# Patient Record
Sex: MALE | Employment: OTHER | URBAN - METROPOLITAN AREA
[De-identification: names, ages, dates, MRNs, and addresses within clinical notes are randomized per-mention and may not be internally consistent; named-entity substitution may affect disease eponyms.]

---

## 2021-08-21 ENCOUNTER — TRANSFERRED RECORDS (OUTPATIENT)
Dept: HEALTH INFORMATION MANAGEMENT | Facility: CLINIC | Age: 81
End: 2021-08-21

## 2021-08-21 LAB
CREATININE (EXTERNAL): 1.31 MG/DL (ref 0.73–1.18)
GFR ESTIMATED (EXTERNAL): 52 ML/MIN/1.73M2
GLUCOSE (EXTERNAL): 106 MG/DL (ref 70–100)
HBA1C MFR BLD: 5.8 %
POTASSIUM (EXTERNAL): 4.6 MMOL/L (ref 3.5–5.1)

## 2021-11-16 ENCOUNTER — NURSE TRIAGE (OUTPATIENT)
Dept: NURSING | Facility: CLINIC | Age: 81
End: 2021-11-16

## 2021-11-16 NOTE — TELEPHONE ENCOUNTER
Reason for Disposition    MODERATE pain (e.g., interferes with normal activities or awakens from sleep)    Additional Information    Negative: Passed out (i.e., lost consciousness, collapsed and was not responding)    Negative: Shock suspected (e.g., cold/pale/clammy skin, too weak to stand, low BP, rapid pulse)    Negative: Sounds like a life-threatening emergency to the triager    Negative: SEVERE pain (e.g., excruciating, scale 8-10) and present > 1 hour    Negative: Sudden onset of severe flank pain and age > 60    Negative: Abdominal pain and age > 60    Negative: Unable to urinate (or only a few drops) > 4 hours and bladder feels very full (e.g., palpable bladder or strong urge to urinate)    Negative: Pain radiates into groin, scrotum    Negative: Blood in urine (red, pink, or tea-colored)    Negative: Vomiting    Negative: Weakness of a leg or foot (e.g., unable to bear weight, dragging foot)    Negative: Patient sounds very sick or weak to the triager    Negative: Fever > 100.4 F (38.0 C)    Negative: Pain or burning with passing urine (urination)    Protocols used: FLANK PAIN-A-OH

## 2021-11-16 NOTE — TELEPHONE ENCOUNTER
Spoke to patient. Scheduled with internal med on 11/17/21.  Mary Nascimento, HARRIET on 11/16/2021 at 4:17 PM

## 2021-11-16 NOTE — TELEPHONE ENCOUNTER
"Clinic Action Needed: Yes, new patient asking if he can be seen Wed PM or Thu/Fri. Please call Jose Rafael at [phone].    FNA Triage Call  Presenting Problem:    Jose Rafael reports the following:  - left flank pain x 1 week, radiates to his back. Located in \"belt area.\"  - passing gas helps with pain relief.  - difficult to sleep at times  - constant pain, described as dull  - pain rated 2/10 at the time of call, can be 5/10 sometimes  - feels like he has to pass BM all the time, but not passing any stools when he attempts to toilet. Had BM this AM.  - sometimes has urinary incontinence    Takes Gaviscon for acid reflux.    Recently moved to [place]. Previous PCP with [facility]. Would like to establish care with PCPs Mayerchak, Fischer, Schoen, Halberg. FNA advised he would need an acute visit and he said he's okay seeing any available provider.     Per protocol, advised same day visit. Care advice reviewed. Patient verbalizes understanding. Advised to call back with further questions/concerns.     Per , no openings at [facility] location until 11/30, [facility]'s soonest visit is in December, same as with other nearby clinics.    Routing message to [facility] Care Team to offer pt a sooner opening. Pt states that he is available on Thu or Fri, or tomorrow late in the afternoon.    Belkis Herrera RN/[place] Nurse Advisor                     "

## 2022-02-06 ENCOUNTER — HEALTH MAINTENANCE LETTER (OUTPATIENT)
Age: 82
End: 2022-02-06

## 2022-02-11 ENCOUNTER — OFFICE VISIT (OUTPATIENT)
Dept: FAMILY MEDICINE | Facility: CLINIC | Age: 82
End: 2022-02-11
Payer: COMMERCIAL

## 2022-02-11 VITALS
DIASTOLIC BLOOD PRESSURE: 70 MMHG | HEART RATE: 85 BPM | BODY MASS INDEX: 33.27 KG/M2 | HEIGHT: 66 IN | TEMPERATURE: 97.4 F | OXYGEN SATURATION: 95 % | WEIGHT: 207 LBS | SYSTOLIC BLOOD PRESSURE: 134 MMHG | RESPIRATION RATE: 10 BRPM

## 2022-02-11 DIAGNOSIS — N18.30 TYPE 2 DIABETES MELLITUS WITH STAGE 3 CHRONIC KIDNEY DISEASE, WITHOUT LONG-TERM CURRENT USE OF INSULIN, UNSPECIFIED WHETHER STAGE 3A OR 3B CKD (H): ICD-10-CM

## 2022-02-11 DIAGNOSIS — Z76.89 ENCOUNTER TO ESTABLISH CARE WITH NEW DOCTOR: Primary | ICD-10-CM

## 2022-02-11 DIAGNOSIS — E11.22 TYPE 2 DIABETES MELLITUS WITH STAGE 3 CHRONIC KIDNEY DISEASE, WITHOUT LONG-TERM CURRENT USE OF INSULIN, UNSPECIFIED WHETHER STAGE 3A OR 3B CKD (H): ICD-10-CM

## 2022-02-11 PROBLEM — N18.32 TYPE 2 DIABETES MELLITUS WITH STAGE 3B CHRONIC KIDNEY DISEASE, WITHOUT LONG-TERM CURRENT USE OF INSULIN (H): Status: ACTIVE | Noted: 2022-02-11

## 2022-02-11 PROCEDURE — 99203 OFFICE O/P NEW LOW 30 MIN: CPT | Performed by: FAMILY MEDICINE

## 2022-02-11 RX ORDER — ASPIRIN 81 MG/1
TABLET, CHEWABLE ORAL
COMMUNITY

## 2022-02-11 RX ORDER — AMLODIPINE BESYLATE 10 MG/1
TABLET ORAL
COMMUNITY
End: 2023-02-14

## 2022-02-11 RX ORDER — LISINOPRIL 5 MG/1
TABLET ORAL
COMMUNITY
End: 2022-09-09

## 2022-02-11 RX ORDER — CHOLECALCIFEROL (VITAMIN D3) 50 MCG
TABLET ORAL
COMMUNITY
End: 2023-10-18

## 2022-02-11 RX ORDER — METFORMIN HYDROCHLORIDE 750 MG/1
TABLET, EXTENDED RELEASE ORAL
COMMUNITY
End: 2022-06-24

## 2022-02-11 ASSESSMENT — MIFFLIN-ST. JEOR: SCORE: 1596.7

## 2022-02-11 NOTE — PROGRESS NOTES
"  Assessment & Plan     Encounter to establish care with new doctor  Patient has been under the care of Dr. Jeremy Davenport at [facility] in [place]. Dr. Davenport retired. Patient and wife moved to [facility]. Patient requests transfer care to me.   Patient medications reconciled, PMH and Problem list reviewed and HM updated.    Type 2 diabetes mellitus with stage 3 chronic kidney disease, without long-term current use of insulin, unspecified whether stage 3a or 3b CKD (H)  Chronic controlled with last A1C 5.8. Currently on ASA, ACEI low dose and metformin. Currently not on statin as LDL was 81. The current medical regimen is effective;  continue present plan and medications. Follow up in 5 months for AWV      Review of prior external note(s) from - CareEverywhere information from [facility]  reviewed         BMI:   Estimated body mass index is 33.41 kg/m  as calculated from the following:    Height as of this encounter: 1.676 m (5' 6\").    Weight as of this encounter: 93.9 kg (207 lb).   Weight management plan: Discussed healthy diet and exercise guidelines    Work on weight loss  Regular exercise  Use Tonic Water in evening for leg cramps    Return in about 5 months (around 7/11/2022) for Follow up, Routine preventive, with me, in person.    Dk Ortiz MD  [facility] WARD Mantilla is a 81 year old who presents for the following health issues     History of Present Illness   He consumes 2 sweetened beverage(s) daily.He exercises with enough effort to increase his heart rate 9 or less minutes per day.  He exercises with enough effort to increase his heart rate 3 or less days per week. He is missing 1 dose(s) of medications per week.  He is not taking prescribed medications regularly due to remembering to take.       Chief Complaint   Patient presents with     Establish Care     Patient has been under the care of Dr. Jeremy Davenport at [facility] in [place]. " "Dr. Davenport retired. Patient and wife moved to [facility]. Patient requests transfer care to me.   Patient medications reconciled, PMH and Problem list reviewed and HM updated.    Diabetes Follow-up    How often are you checking your blood sugar? A few times a month  What time of day are you checking your blood sugars (select all that apply)?  In the morning  Have you had any blood sugars above 200?  No  Have you had any blood sugars below 70?  No    What symptoms do you notice when your blood sugar is low?  None    What concerns do you have today about your diabetes? None     Do you have any of these symptoms? (Select all that apply)  Burning in feet and Redness, sores, or blisters on feet                Hypertension Follow-up      Do you check your blood pressure regularly outside of the clinic? No     Are you following a low salt diet? No    Are your blood pressures ever more than 140 on the top number (systolic) OR more   than 90 on the bottom number (diastolic), for example 140/90? No NA       Reports hand and leg cramps every morning. Does play violin, mentions left hand cramping while driving home from playing, a few times.     BP Readings from Last 2 Encounters:   02/11/22 134/70     No results found for: A1C, LDL    Review of Systems   CONSTITUTIONAL: NEGATIVE for fever, chills, change in weight  ENT/MOUTH: NEGATIVE for ear, mouth and throat problems  RESP: NEGATIVE for significant cough or SOB  CV: NEGATIVE for chest pain, palpitations or peripheral edema  GI: POSITIVE for dysphagia, heartburn or reflux, Hx GERD and prior EGD in 2016 and NEGATIVE for melena, vomiting and weight loss  MUSCULOSKELETAL: NEGATIVE for significant arthralgias or myalgia and muscle cramps-nocturnal  NEURO: NEGATIVE for weakness, dizziness or paresthesias  ROS otherwise negative      Objective    /70   Pulse 85   Temp 97.4  F (36.3  C)   Resp 10   Ht 1.676 m (5' 6\")   Wt 93.9 kg (207 lb)   SpO2 95%   BMI 33.41 " kg/m    Body mass index is 33.41 kg/m .  Physical Exam   GENERAL: healthy, alert, no distress and obese  NECK: no adenopathy, no asymmetry, masses, or scars and thyroid normal to palpation  RESP: lungs clear to auscultation - no rales, rhonchi or wheezes  CV: regular rate and rhythm, normal S1 S2, no S3 or S4, no murmur, click or rub, no peripheral edema and peripheral pulses strong  ABDOMEN: soft, nontender, no hepatosplenomegaly, no masses and bowel sounds normal  MS: no gross musculoskeletal defects noted, no edema    No results found for any previous visit.

## 2022-03-03 ENCOUNTER — E-VISIT (OUTPATIENT)
Dept: INTERNAL MEDICINE | Facility: CLINIC | Age: 82
End: 2022-03-03
Payer: COMMERCIAL

## 2022-03-03 DIAGNOSIS — G47.62 LEG CRAMPS, SLEEP RELATED: Primary | ICD-10-CM

## 2022-03-03 DIAGNOSIS — E11.22 TYPE 2 DIABETES MELLITUS WITH STAGE 3B CHRONIC KIDNEY DISEASE, WITHOUT LONG-TERM CURRENT USE OF INSULIN (H): ICD-10-CM

## 2022-03-03 DIAGNOSIS — N18.32 TYPE 2 DIABETES MELLITUS WITH STAGE 3B CHRONIC KIDNEY DISEASE, WITHOUT LONG-TERM CURRENT USE OF INSULIN (H): ICD-10-CM

## 2022-03-03 PROCEDURE — 99421 OL DIG E/M SVC 5-10 MIN: CPT | Performed by: INTERNAL MEDICINE

## 2022-03-04 NOTE — PATIENT INSTRUCTIONS
Thank you for choosing us for your care. Given your symptoms, I would like you to do a lab-only visit to determine what is causing them.  I have placed the orders.  Please schedule an appointment with the lab right here in [place], or call [phone].  I will let you know when the results are back and next steps to take.

## 2022-03-10 ENCOUNTER — LAB (OUTPATIENT)
Dept: LAB | Facility: CLINIC | Age: 82
End: 2022-03-10
Payer: COMMERCIAL

## 2022-03-10 DIAGNOSIS — E11.22 TYPE 2 DIABETES MELLITUS WITH STAGE 3B CHRONIC KIDNEY DISEASE, WITHOUT LONG-TERM CURRENT USE OF INSULIN (H): ICD-10-CM

## 2022-03-10 DIAGNOSIS — N18.32 TYPE 2 DIABETES MELLITUS WITH STAGE 3B CHRONIC KIDNEY DISEASE, WITHOUT LONG-TERM CURRENT USE OF INSULIN (H): ICD-10-CM

## 2022-03-10 DIAGNOSIS — G47.62 LEG CRAMPS, SLEEP RELATED: ICD-10-CM

## 2022-03-10 LAB
ALBUMIN SERPL-MCNC: 4 G/DL (ref 3.4–5)
ALP SERPL-CCNC: 59 U/L (ref 40–150)
ALT SERPL W P-5'-P-CCNC: 29 U/L (ref 0–70)
ANION GAP SERPL CALCULATED.3IONS-SCNC: 5 MMOL/L (ref 3–14)
AST SERPL W P-5'-P-CCNC: 18 U/L (ref 0–45)
BILIRUB SERPL-MCNC: 0.5 MG/DL (ref 0.2–1.3)
BUN SERPL-MCNC: 17 MG/DL (ref 7–30)
CALCIUM SERPL-MCNC: 8.8 MG/DL (ref 8.5–10.1)
CHLORIDE BLD-SCNC: 103 MMOL/L (ref 94–109)
CO2 SERPL-SCNC: 28 MMOL/L (ref 20–32)
CREAT SERPL-MCNC: 1.32 MG/DL (ref 0.66–1.25)
FERRITIN SERPL-MCNC: 184 NG/ML (ref 26–388)
GFR SERPL CREATININE-BSD FRML MDRD: 55 ML/MIN/1.73M2
GLUCOSE BLD-MCNC: 110 MG/DL (ref 70–99)
HBA1C MFR BLD: 6 % (ref 0–5.6)
IRON SATN MFR SERPL: 18 % (ref 15–46)
IRON SERPL-MCNC: 57 UG/DL (ref 35–180)
MAGNESIUM SERPL-MCNC: 2 MG/DL (ref 1.6–2.3)
POTASSIUM BLD-SCNC: 4.1 MMOL/L (ref 3.4–5.3)
PROT SERPL-MCNC: 7.6 G/DL (ref 6.8–8.8)
SODIUM SERPL-SCNC: 136 MMOL/L (ref 133–144)
T4 FREE SERPL-MCNC: 0.92 NG/DL (ref 0.76–1.46)
TIBC SERPL-MCNC: 317 UG/DL (ref 240–430)
TSH SERPL DL<=0.005 MIU/L-ACNC: 4.71 MU/L (ref 0.4–4)

## 2022-03-10 PROCEDURE — 84439 ASSAY OF FREE THYROXINE: CPT

## 2022-03-10 PROCEDURE — 82728 ASSAY OF FERRITIN: CPT

## 2022-03-10 PROCEDURE — 36415 COLL VENOUS BLD VENIPUNCTURE: CPT

## 2022-03-10 PROCEDURE — 80053 COMPREHEN METABOLIC PANEL: CPT

## 2022-03-10 PROCEDURE — 83550 IRON BINDING TEST: CPT

## 2022-03-10 PROCEDURE — 84443 ASSAY THYROID STIM HORMONE: CPT

## 2022-03-10 PROCEDURE — 83735 ASSAY OF MAGNESIUM: CPT

## 2022-03-10 PROCEDURE — 83036 HEMOGLOBIN GLYCOSYLATED A1C: CPT

## 2022-03-31 ENCOUNTER — TELEPHONE (OUTPATIENT)
Dept: FAMILY MEDICINE | Facility: CLINIC | Age: 82
End: 2022-03-31
Payer: COMMERCIAL

## 2022-03-31 DIAGNOSIS — L30.9 DERMATITIS: Primary | ICD-10-CM

## 2022-03-31 NOTE — TELEPHONE ENCOUNTER
Reason for Call:  Medication or medication refill:     Do you use a [facility]?  Name of the pharmacy and phone number for the current request:  [facility]    Name of the medication requested: triancinolone 0.1%     Other request: would like the Jar of it. 450g. Not the small tube.   He uses it for his skin condition that he has had since birth when it becomes inflamed.     Can we leave a detailed message on this number? YES    Phone number patient can be reached at: Home number on file [phone] (home)    Best Time: anytime.     Call taken on 3/31/2022 at 3:43 PM by Kat Brambila

## 2022-04-01 RX ORDER — TRIAMCINOLONE ACETONIDE 1 MG/G
CREAM TOPICAL 3 TIMES DAILY
Qty: 450 G | Refills: 0 | Status: SHIPPED | OUTPATIENT
Start: 2022-04-01 | End: 2023-10-18

## 2022-06-22 DIAGNOSIS — N18.32 TYPE 2 DIABETES MELLITUS WITH STAGE 3B CHRONIC KIDNEY DISEASE, WITHOUT LONG-TERM CURRENT USE OF INSULIN (H): Primary | ICD-10-CM

## 2022-06-22 DIAGNOSIS — E11.22 TYPE 2 DIABETES MELLITUS WITH STAGE 3B CHRONIC KIDNEY DISEASE, WITHOUT LONG-TERM CURRENT USE OF INSULIN (H): Primary | ICD-10-CM

## 2022-06-24 RX ORDER — METFORMIN HYDROCHLORIDE 750 MG/1
TABLET, EXTENDED RELEASE ORAL
Qty: 270 TABLET | Refills: 0 | Status: SHIPPED | OUTPATIENT
Start: 2022-06-24 | End: 2022-08-03

## 2022-06-24 NOTE — TELEPHONE ENCOUNTER
Pending Prescriptions:                       Disp   Refills    metFORMIN (GLUCOPHAGE-XR) 750 MG 24 hr tab*270 ta*         Sig: TAKE 2 TABLETS BY MOUTH EVERY MORNING AND 1 TABLET BY           MOUTH EVERY EVENING.    Routing refill request to provider for review/approval because:  Medication is reported/historical

## 2022-06-30 ENCOUNTER — TELEPHONE (OUTPATIENT)
Dept: FAMILY MEDICINE | Facility: CLINIC | Age: 82
End: 2022-06-30

## 2022-06-30 NOTE — TELEPHONE ENCOUNTER
Central Prior Authorization Team   Phone: [phone]      PA Initiation    Medication: metFORMIN (GLUCOPHAGE-XR) 750 MG 24 hr tablet  Insurance Company: [employer] - Phone [phone] Fax [phone]  Pharmacy Filling the Rx: [id number] [place], [place] - [address] [facility] [address]  Filling Pharmacy Phone: [phone]  Filling Pharmacy Fax:    Start Date: 6/30/2022

## 2022-06-30 NOTE — TELEPHONE ENCOUNTER
Patient stating his insurance is only wanting to cover two tablets daily instead of the three as prescribed. Informed we would place a prior auth on his metformin. Stacey Yip LPN    Prior Authorization Retail Medication Request    Medication/Dose: metFORMIN (GLUCOPHAGE-XR) 750 MG 24 hr tablet  ICD code (if different than what is on RX): Type 2 diabetes mellitus with stage 3b chronic kidney disease, without long-term current use of insulin (H) [E11.22, N18.32]  - Primary     Previously Tried and Failed:    Rationale:      Insurance Name:  [employer]  Insurance ID:  [id number]      Pharmacy Information (if different than what is on RX)  Name:  [facility]  Phone:  [phone]

## 2022-07-01 NOTE — TELEPHONE ENCOUNTER
Prior Authorization Approval    Authorization Effective Date: 5/30/2022  Authorization Expiration Date: 6/30/2023  Medication: metFORMIN (GLUCOPHAGE-XR) 750 MG 24 hr tablet-APPROVED  Approved Dose/Quantity:   Reference #:     Insurance Company: [employer] - Phone [phone] Fax [phone]  Expected CoPay:       CoPay Card Available:      Foundation Assistance Needed:    Which Pharmacy is filling the prescription (Not needed for infusion/clinic administered): [id number] [place], [place] - [address] [facility] [address]  Pharmacy Notified: Yes  Patient Notified: No    **Instructed pharmacy to notify patient when script is ready to /ship.**

## 2022-07-24 ENCOUNTER — HEALTH MAINTENANCE LETTER (OUTPATIENT)
Age: 82
End: 2022-07-24

## 2022-08-03 ENCOUNTER — OFFICE VISIT (OUTPATIENT)
Dept: FAMILY MEDICINE | Facility: CLINIC | Age: 82
End: 2022-08-03
Payer: COMMERCIAL

## 2022-08-03 VITALS
TEMPERATURE: 97.2 F | SYSTOLIC BLOOD PRESSURE: 138 MMHG | RESPIRATION RATE: 20 BRPM | HEART RATE: 84 BPM | BODY MASS INDEX: 33.98 KG/M2 | OXYGEN SATURATION: 94 % | DIASTOLIC BLOOD PRESSURE: 76 MMHG | WEIGHT: 210.5 LBS

## 2022-08-03 DIAGNOSIS — Z11.59 NEED FOR HEPATITIS C SCREENING TEST: ICD-10-CM

## 2022-08-03 DIAGNOSIS — E11.22 TYPE 2 DIABETES MELLITUS WITH STAGE 3B CHRONIC KIDNEY DISEASE, WITHOUT LONG-TERM CURRENT USE OF INSULIN (H): ICD-10-CM

## 2022-08-03 DIAGNOSIS — N18.32 TYPE 2 DIABETES MELLITUS WITH STAGE 3B CHRONIC KIDNEY DISEASE, WITHOUT LONG-TERM CURRENT USE OF INSULIN (H): ICD-10-CM

## 2022-08-03 DIAGNOSIS — Z00.00 ENCOUNTER FOR MEDICARE ANNUAL WELLNESS EXAM: Primary | ICD-10-CM

## 2022-08-03 LAB
CHOLEST SERPL-MCNC: 134 MG/DL
CREAT UR-MCNC: 169 MG/DL
FASTING STATUS PATIENT QL REPORTED: NO
HBA1C MFR BLD: 5.9 % (ref 0–5.6)
HDLC SERPL-MCNC: 27 MG/DL
HGB BLD-MCNC: 15.7 G/DL (ref 13.3–17.7)
LDLC SERPL CALC-MCNC: 46 MG/DL
MICROALBUMIN UR-MCNC: 177 MG/L
MICROALBUMIN/CREAT UR: 104.73 MG/G CR (ref 0–17)
NONHDLC SERPL-MCNC: 107 MG/DL
PHOSPHATE SERPL-MCNC: 2.9 MG/DL (ref 2.5–4.5)
PTH-INTACT SERPL-MCNC: 32 PG/ML (ref 15–65)
TRIGL SERPL-MCNC: 306 MG/DL

## 2022-08-03 PROCEDURE — 82043 UR ALBUMIN QUANTITATIVE: CPT | Performed by: FAMILY MEDICINE

## 2022-08-03 PROCEDURE — 36415 COLL VENOUS BLD VENIPUNCTURE: CPT | Performed by: FAMILY MEDICINE

## 2022-08-03 PROCEDURE — 99213 OFFICE O/P EST LOW 20 MIN: CPT | Mod: 25 | Performed by: FAMILY MEDICINE

## 2022-08-03 PROCEDURE — 86803 HEPATITIS C AB TEST: CPT | Performed by: FAMILY MEDICINE

## 2022-08-03 PROCEDURE — 84100 ASSAY OF PHOSPHORUS: CPT | Performed by: FAMILY MEDICINE

## 2022-08-03 PROCEDURE — 83036 HEMOGLOBIN GLYCOSYLATED A1C: CPT | Performed by: FAMILY MEDICINE

## 2022-08-03 PROCEDURE — G0439 PPPS, SUBSEQ VISIT: HCPCS | Performed by: FAMILY MEDICINE

## 2022-08-03 PROCEDURE — 80061 LIPID PANEL: CPT | Performed by: FAMILY MEDICINE

## 2022-08-03 PROCEDURE — 83970 ASSAY OF PARATHORMONE: CPT | Performed by: FAMILY MEDICINE

## 2022-08-03 PROCEDURE — 85018 HEMOGLOBIN: CPT | Performed by: FAMILY MEDICINE

## 2022-08-03 RX ORDER — METFORMIN HYDROCHLORIDE 750 MG/1
TABLET, EXTENDED RELEASE ORAL
Qty: 270 TABLET | Refills: 3 | Status: SHIPPED | OUTPATIENT
Start: 2022-08-03 | End: 2023-10-18

## 2022-08-03 ASSESSMENT — ENCOUNTER SYMPTOMS
DIARRHEA: 0
SORE THROAT: 0
MYALGIAS: 1
EYE PAIN: 0
PARESTHESIAS: 0
HEARTBURN: 0
FEVER: 0
COUGH: 0
NERVOUS/ANXIOUS: 0
NAUSEA: 0
SHORTNESS OF BREATH: 0
PALPITATIONS: 0
DIZZINESS: 0
HEADACHES: 0
ABDOMINAL PAIN: 0
HEMATURIA: 0
JOINT SWELLING: 0
WEAKNESS: 0
FREQUENCY: 0
ARTHRALGIAS: 0
CONSTIPATION: 0
DYSURIA: 0
CHILLS: 0
HEMATOCHEZIA: 0

## 2022-08-03 ASSESSMENT — PAIN SCALES - GENERAL: PAINLEVEL: NO PAIN (0)

## 2022-08-03 ASSESSMENT — ACTIVITIES OF DAILY LIVING (ADL): CURRENT_FUNCTION: NO ASSISTANCE NEEDED

## 2022-08-03 NOTE — PATIENT INSTRUCTIONS
Patient Education   Personalized Prevention Plan  You are due for the preventive services outlined below.  Your care team is available to assist you in scheduling these services.  If you have already completed any of these items, please share that information with your care team to update in your medical record.  Health Maintenance Due   Topic Date Due     Cholesterol Lab  Never done     Kidney Microalbumin Urine Test  Never done     Parathyroid Lab  Never done     Phosphorous Lab  Never done     Diabetic Foot Exam  Never done     ANNUAL REVIEW OF HM ORDERS  Never done     Discuss Advance Care Planning  Never done     Eye Exam  Never done     Hemoglobin  Never done     Urine Test  Never done     Hepatitis C Screening  Never done     Pneumococcal Vaccine (2 - PCV) 12/08/2011     Diptheria Tetanus Pertussis (DTAP/TDAP/TD) Vaccine (2 - Td or Tdap) 12/03/2017     Zoster (Shingles) Vaccine (2 of 2) 11/21/2019     COVID-19 Vaccine (4 - Booster for Pfizer series) 04/14/2022     A1C Lab  06/10/2022       Exercise for a Healthier Heart  You may wonder how you can improve the health of your heart. If you re thinking about exercise, you re on the right track. You don t need to become an athlete. But you do need a certain amount of brisk exercise to help strengthen your heart. If you have been diagnosed with a heart condition, your healthcare provider may advise exercise to help stabilize your condition. To help make exercise a habit, choose safe, fun activities.      Exercise with a friend. When activity is fun, you're more likely to stick with it.   Before you start  Check with your healthcare provider before starting an exercise program. This is especially important if you have not been active for a while. It's also important if you have a long-term (chronic) health problem such as heart disease, diabetes, or obesity. Or if you are at high risk for having these problems.   Why exercise?  Exercising regularly offers many  healthy rewards. It can help you do all of the following:     Improve your blood cholesterol level to help prevent further heart trouble    Lower your blood pressure to help prevent a stroke or heart attack    Control diabetes, or reduce your risk of getting this disease    Improve your heart and lung function    Reach and stay at a healthy weight    Make your muscles stronger so you can stay active    Prevent falls and fractures by slowing the loss of bone mass (osteoporosis)    Manage stress better    Reduce your blood pressure    Improve your sense of self and your body image  Exercise tips      Ease into your routine. Set small goals. Then build on them. If you are not sure what your activity level should be, talk with your healthcare provider first before starting an exercise routine.    Exercise on most days. Aim for a total of 150 minutes (2 hours and 30 minutes) or more of moderate-intensity aerobic activity each week. Or 75 minutes (1 hour and 15 minutes) or more of vigorous-intensity aerobic activity each week. Or try for a combination of both. Moderate activity means that you breathe heavier and your heart rate increases but you can still talk. Think about doing 40 minutes of moderate exercise, 3 to 4 times a week. For best results, activity should last for about 40 minutes to lower blood pressure and cholesterol. It's OK to work up to the 40-minute period over time. Examples of moderate-intensity activity are walking 1 mile in 15 minutes. Or doing 30 to 45 minutes of yard work.    Step up your daily activity level.  Along with your exercise program, try being more active the whole day. Walk instead of drive. Or park further away so that you take more steps each day. Do more household tasks or yard work. You may not be able to meet the advised mount of physical activity. But doing some moderate- or vigorous-intensity aerobic activity can help reduce your risk for heart disease. Your healthcare provider  can help you figure out what is best for you.    Choose 1 or more activities you enjoy.  Walking is one of the easiest things you can do. You can also try swimming, riding a bike, dancing, or taking an exercise class.    When to call your healthcare provider  Call your healthcare provider if you have any of these:     Chest pain or feel dizzy or lightheaded    Burning, tightness, pressure, or heaviness in your chest, neck, shoulders, back, or arms    Abnormal shortness of breath    More joint or muscle pain    A very fast or irregular heartbeat (palpitations)  [employer] last reviewed this educational content on 7/1/2019 2000-2021 [employer]. All rights reserved. This information is not intended as a substitute for professional medical care. Always follow your healthcare professional's instructions.          Understanding USDA MyPlate  The USDA has guidelines to help you make healthy food choices. These are called MyPlate. MyPlate shows the food groups that make up healthy meals using the image of a place setting. Before you eat, think about the healthiest choices for what to put on your plate or in your cup or bowl. To learn more about building a healthy plate, visit www.choosemyplate.gov.    The food groups    Fruits. Any fruit or 100% fruit juice counts as part of the Fruit Group. Fruits may be fresh, canned, frozen, or dried, and may be whole, cut-up, or pureed. Make 1/2 of your plate fruits and vegetables.    Vegetables. Any vegetable or 100% vegetable juice counts as a member of the Vegetable Group. Vegetables may be fresh, frozen, canned, or dried. They can be served raw or cooked and may be whole, cut-up, or mashed. Make 1/2 of your plate fruits and vegetables.    Grains. All foods made from grains are part of the Grains Group. These include wheat, rice, oats, cornmeal, and barley. Grains are often used to make foods such as bread, pasta, oatmeal, cereal, tortillas, and grits. Grains should  be no more than 1/4 of your plate. At least half of your grains should be whole grains.    Protein. This group includes meat, poultry, seafood, beans and peas, eggs, processed soy products (such as tofu), nuts (including nut butters), and seeds. Make protein choices no more than 1/4 of your plate. Meat and poultry choices should be lean or low fat.    Dairy. The Dairy Group includes all fluid milk products and foods made from milk that contain calcium, such as yogurt and cheese. (Foods that have little calcium, such as cream, butter, and cream cheese, are not part of this group.) Most dairy choices should be low-fat or fat-free.    Oils. Oils aren't a food group, but they do contain essential nutrients. However it's important to watch your intake of oils. These are fats that are liquid at room temperature. They include canola, corn, olive, soybean, vegetable, and sunflower oil. Foods that are mainly oil include mayonnaise, certain salad dressings, and soft margarines. You likely already get your daily oil allowance from the foods you eat.  Things to limit  Eating healthy also means limiting these things in your diet:       Salt (sodium). Many processed foods have a lot of sodium. To keep sodium intake down, eat fresh vegetables, meats, poultry, and seafood when possible. Purchase low-sodium, reduced-sodium, or no-salt-added food products at the store. And don't add salt to your meals at home. Instead, season them with herbs and spices such as dill, oregano, cumin, and paprika. Or try adding flavor with lemon or lime zest and juice.    Saturated fat. Saturated fats are most often found in animal products such as beef, pork, and chicken. They are often solid at room temperature, such as butter. To reduce your saturated fat intake, choose leaner cuts of meat and poultry. And try healthier cooking methods such as grilling, broiling, roasting, or baking. For a simple lower-fat swap, use plain nonfat yogurt instead of  mayonnaise when making potato salad or macaroni salad.    Added sugars. These are sugars added to foods. They are in foods such as ice cream, candy, soda, fruit drinks, sports drinks, energy drinks, cookies, pastries, jams, and syrups. Cut down on added sugars by sharing sweet treats with a family member or friend. You can also choose fruit for dessert, and drink water or other unsweetened beverages.     [email or web address] last reviewed this educational content on 6/1/2020 2000-2021 [employer]. All rights reserved. This information is not intended as a substitute for professional medical care. Always follow your healthcare professional's instructions.          Signs of Hearing Loss      Hearing much better with one ear can be a sign of hearing loss.   Hearing loss is a problem shared by many people. In fact, it is one of the most common health problems, particularly as people age. Most people age 65 and older have some hearing loss. By age 80, almost everyone does. Hearing loss often occurs slowly over the years. So you may not realize your hearing has gotten worse.  Have your hearing checked  Call your healthcare provider if you:    Have to strain to hear normal conversation    Have to watch other people s faces very carefully to follow what they re saying    Need to ask people to repeat what they ve said    Often misunderstand what people are saying    Turn the volume of the television or radio up so high that others complain    Feel that people are mumbling when they re talking to you    Find that the effort to hear leaves you feeling tired and irritated    Notice, when using the phone, that you hear better with one ear than the other  [email or web address] last reviewed this educational content on 1/1/2020 2000-2021 [employer]. All rights reserved. This information is not intended as a substitute for professional medical care. Always follow your healthcare professional's instructions.

## 2022-08-03 NOTE — PROGRESS NOTES
"SUBJECTIVE:   Jose Rafael Gama is a 81 year old male who presents for Preventive Visit.    Patient has been advised of split billing requirements and indicates understanding: Yes  Are you in the first 12 months of your Medicare coverage?  No    Healthy Habits:     In general, how would you rate your overall health?  Good    Frequency of exercise:  None    Do you usually eat at least 4 servings of fruit and vegetables a day, include whole grains    & fiber and avoid regularly eating high fat or \"junk\" foods?  No    Taking medications regularly:  Yes    Medication side effects:  Muscle aches    Ability to successfully perform activities of daily living:  No assistance needed    Home Safety:  No safety concerns identified    Hearing Impairment:  Difficulty following a conversation in a noisy restaurant or crowded room, need to ask people to speak up or repeat themselves and difficulty understanding soft or whispered speech    In the past 6 months, have you been bothered by leaking of urine?  No    In general, how would you rate your overall mental or emotional health?  Good      PHQ-2 Total Score: 0    Additional concerns today:  No    Do you feel safe in your environment? Yes    Have you ever done Advance Care Planning? (For example, a Health Directive, POLST, or a discussion with a medical provider or your loved ones about your wishes): No, advance care planning information given to patient to review.  Patient plans to discuss their wishes with loved ones or provider.         Fall risk  Fallen 2 or more times in the past year?: No  Any fall with injury in the past year?: No    Cognitive Screening   1) Repeat 3 items (Leader, Season, Table)    2) Clock draw: ABNORMAL correct placement, no numbers  3) 3 item recall: Recalls 3 objects  Results: 3 items recalled: COGNITIVE IMPAIRMENT LESS LIKELY    Mini-CogTM Copyright TYRA Crowell. Licensed by the author for use in [facility]; reprinted with permission " (sue@[facility]). All rights reserved.          Reviewed and updated as needed this visit by clinical staff   Tobacco  Allergies  Meds  Problems  Med Hx  Surg Hx  Fam Hx  Soc   Hx          Reviewed and updated as needed this visit by Provider   Tobacco  Allergies  Meds  Problems  Med Hx  Surg Hx  Fam Hx           Social History     Tobacco Use     Smoking status: Never Smoker     Smokeless tobacco: Never Used   Substance Use Topics     Alcohol use: Not Currently     If you drink alcohol do you typically have >3 drinks per day or >7 drinks per week? No    Alcohol Use 8/3/2022   Prescreen: >3 drinks/day or >7 drinks/week? Not Applicable   Prescreen: >3 drinks/day or >7 drinks/week? -           Diabetes Follow-up    How often are you checking your blood sugar? One time daily  What time of day are you checking your blood sugars (select all that apply)?  Before meals  Have you had any blood sugars above 200?  No  Have you had any blood sugars below 70?  No    What symptoms do you notice when your blood sugar is low?  Shaky    What concerns do you have today about your diabetes? None     Do you have any of these symptoms? (Select all that apply)  No numbness or tingling in feet.  No redness, sores or blisters on feet.  No complaints of excessive thirst.  No reports of blurry vision.  No significant changes to weight.    Have you had a diabetic eye exam in the last 12 months? No        BP Readings from Last 2 Encounters:   08/03/22 138/76   02/11/22 134/70     Hemoglobin A1C (%)   Date Value   03/10/2022 6.0 (H)           Current providers sharing in care for this patient include:   Patient Care Team:  Dk Ortiz MD as PCP - General (Family Medicine)  Dk Ortiz MD as Assigned PCP    The following health maintenance items are reviewed in Epic and correct as of today:  Health Maintenance Due   Topic Date Due     LIPID  Never done     MICROALBUMIN  Never done     PARATHYROID  Never done     PHOSPHORUS   Never done     DIABETIC FOOT EXAM  Never done     ADVANCE CARE PLANNING  Never done     EYE EXAM  Never done     HEMOGLOBIN  Never done     URINALYSIS  Never done     HEPATITIS C SCREENING  Never done     Pneumococcal Vaccine: 65+ Years (2 - PCV) 12/08/2011     DTAP/TDAP/TD IMMUNIZATION (2 - Td or Tdap) 12/03/2017     ZOSTER IMMUNIZATION (2 of 2) 11/21/2019     COVID-19 Vaccine (4 - Booster for Pfizer series) 04/14/2022     A1C  06/10/2022     Labs reviewed in EPIC  BP Readings from Last 3 Encounters:   08/03/22 138/76   02/11/22 134/70    Wt Readings from Last 3 Encounters:   08/03/22 95.5 kg (210 lb 8 oz)   02/11/22 93.9 kg (207 lb)                  Patient Active Problem List   Diagnosis     Type 2 diabetes mellitus with stage 3b chronic kidney disease, without long-term current use of insulin (H)     History reviewed. No pertinent surgical history.    Social History     Tobacco Use     Smoking status: Never Smoker     Smokeless tobacco: Never Used   Substance Use Topics     Alcohol use: Not Currently     History reviewed. No pertinent family history.      Current Outpatient Medications   Medication Sig Dispense Refill     amLODIPine (NORVASC) 10 MG tablet amlodipine 10 mg tablet   TAKE 1 TABLET BY MOUTH EVERY DAY       aspirin (ASA) 81 MG chewable tablet        calcium-vitamin D-vitamin K (VIACTIV) 500-500-40 MG-UNT-MCG CHEW        lisinopril (ZESTRIL) 5 MG tablet lisinopril 5 mg tablet   TAKE 1 TABLET BY MOUTH EVERY DAY       metFORMIN (GLUCOPHAGE-XR) 750 MG 24 hr tablet TAKE 2 TABLETS BY MOUTH EVERY MORNING AND 1 TABLET BY MOUTH EVERY EVENING. 270 tablet 0     triamcinolone (KENALOG) 0.1 % external cream Apply topically 3 times daily 450 g 0     vitamin D3 (CHOLECALCIFEROL) 50 mcg (2000 units) tablet        diclofenac (VOLTAREN) 1 % topical gel APPLY TOPICALLY TO THE AFFECTED AREA EVERY 6 HOURS AS NEEDED FOR SHOULDER PAIN (Patient not taking: No sig reported)       Allergies   Allergen Reactions      Hydrochlorothiazide W/Triamterene Other (See Comments)     Leg cramps     Omeprazole Muscle Pain (Myalgia)     Cramps   from  Opmerprazole  ?      Recent Labs   Lab Test 03/10/22  0900   A1C 6.0*   ALT 29   CR 1.32*   GFRESTIMATED 55*   POTASSIUM 4.1   TSH 4.71*      Pneumonia Vaccine:For adults 65 years or older who do not have an immunocompromising condition, cerebrospinal fluid leak, or cochlear implant and want to receive PPSV23 ONLY: Administer 1 dose of PPSV23. Anyone who received any doses of PPSV23 before age 65 should receive 1 final dose of the vaccine at age 65 or older. Administer this last dose at least 5 years after the prior PPSV23 dose.        Review of Systems   Constitutional: Negative for chills and fever.   HENT: Negative for congestion, ear pain, hearing loss and sore throat.    Eyes: Negative for pain and visual disturbance.   Respiratory: Negative for cough and shortness of breath.    Cardiovascular: Negative for chest pain, palpitations and peripheral edema.   Gastrointestinal: Negative for abdominal pain, constipation, diarrhea, heartburn, hematochezia and nausea.   Genitourinary: Negative for dysuria, frequency, genital sores, hematuria, impotence, penile discharge and urgency.   Musculoskeletal: Positive for myalgias. Negative for arthralgias and joint swelling.   Skin: Negative for rash.   Neurological: Negative for dizziness, weakness, headaches and paresthesias.   Psychiatric/Behavioral: Negative for mood changes. The patient is not nervous/anxious.      CONSTITUTIONAL: NEGATIVE for fever, chills, change in weight  ENT/MOUTH: NEGATIVE for ear, mouth and throat problems  RESP: NEGATIVE for significant cough or SOB  CV: NEGATIVE for chest pain, palpitations or peripheral edema  MUSCULOSKELETAL: POSITIVE  for muscle cramps,nocturnal  ROS otherwise negative    OBJECTIVE:   /76 (BP Location: Left arm, Patient Position: Sitting, Cuff Size: Adult Regular)   Pulse 84   Temp 97.2  F  "(36.2  C) (Temporal)   Resp 20   Wt 95.5 kg (210 lb 8 oz)   SpO2 94%   BMI 33.98 kg/m   Estimated body mass index is 33.98 kg/m  as calculated from the following:    Height as of 2/11/22: 1.676 m (5' 6\").    Weight as of this encounter: 95.5 kg (210 lb 8 oz).  Physical Exam  GENERAL: healthy, alert, no distress and obese  NECK: no adenopathy, no asymmetry, masses, or scars and thyroid normal to palpation  RESP: lungs clear to auscultation - no rales, rhonchi or wheezes  CV: regular rate and rhythm, normal S1 S2, no S3 or S4, no murmur, click or rub, no peripheral edema and peripheral pulses strong  ABDOMEN: soft, nontender, no hepatosplenomegaly, no masses and bowel sounds normal  MS: no gross musculoskeletal defects noted, no edema  NEURO: Normal strength and tone, mentation intact and speech normal  PSYCH: mentation appears normal, affect normal/bright    Diagnostic Test Results:  Labs reviewed in Epic    ASSESSMENT / PLAN:       ICD-10-CM    1. Encounter for Medicare annual wellness exam  Z00.00    2. Type 2 diabetes mellitus with stage 3b chronic kidney disease, without long-term current use of insulin (H)  E11.22 Lipid panel reflex to direct LDL Non-fasting    N18.32 Albumin Random Urine Quantitative with Creat Ratio     Parathyroid Hormone Intact     Phosphorus     Hemoglobin     HEMOGLOBIN A1C     metFORMIN (GLUCOPHAGE-XR) 750 MG 24 hr tablet     HEMOGLOBIN A1C     Hemoglobin     Phosphorus     Parathyroid Hormone Intact     Albumin Random Urine Quantitative with Creat Ratio     Lipid panel reflex to direct LDL Non-fasting   3. Need for hepatitis C screening test  Z11.59 Hepatitis C Screen Reflex to HCV RNA Quant and Genotype     Hepatitis C Screen Reflex to HCV RNA Quant and Genotype       Patient has been advised of split billing requirements and indicates understanding: Yes       2. Type 2 diabetes mellitus with stage 3b chronic kidney disease, without long-term current use of insulin (H)  Chronic, " "controlled. The current medical regimen is effective;  continue present plan and medications.   - Lipid panel reflex to direct LDL Non-fasting; Future  - Albumin Random Urine Quantitative with Creat Ratio; Future  - Parathyroid Hormone Intact; Future  - Phosphorus; Future  - Hemoglobin; Future  - HEMOGLOBIN A1C; Future  - metFORMIN (GLUCOPHAGE-XR) 750 MG 24 hr tablet; TAKE 2 TABLETS BY MOUTH EVERY MORNING AND 1 TABLET BY MOUTH EVERY EVENING.  Dispense: 270 tablet; Refill: 3  - HEMOGLOBIN A1C  - Hemoglobin  - Phosphorus  - Parathyroid Hormone Intact  - Albumin Random Urine Quantitative with Creat Ratio  - Lipid panel reflex to direct LDL Non-fasting    3. Need for hepatitis C screening test  Low risk  - Hepatitis C Screen Reflex to HCV RNA Quant and Genotype; Future  - Hepatitis C Screen Reflex to HCV RNA Quant and Genotype      COUNSELING:  Reviewed preventive health counseling, as reflected in patient instructions       Regular exercise       Healthy diet/nutrition       Vision screening       Hearing screening       Dental care       Fall risk prevention       Immunizations    Declined: Influenza, COVID 4, Pneumococcal, TDAP and Zoster due to Other will obtain sequentially through local pharmacy               Aspirin prophylaxis        Hepatitis C screening    Estimated body mass index is 33.98 kg/m  as calculated from the following:    Height as of 2/11/22: 1.676 m (5' 6\").    Weight as of this encounter: 95.5 kg (210 lb 8 oz).    Weight management plan: Discussed healthy diet and exercise guidelines    He reports that he has never smoked. He has never used smokeless tobacco.      Appropriate preventive services were discussed with this patient, including applicable screening as appropriate for cardiovascular disease, diabetes, osteopenia/osteoporosis, and glaucoma.  As appropriate for age/gender, discussed screening for colorectal cancer, prostate cancer, breast cancer, and cervical cancer. Checklist reviewing " preventive services available has been given to the patient.    Reviewed patients plan of care and provided an AVS. The Basic Care Plan (routine screening as documented in Health Maintenance) for Jose Rafael meets the Care Plan requirement. This Care Plan has been established and reviewed with the Patient.    Counseling Resources:  ATP IV Guidelines  Pooled Cohorts Equation Calculator  Breast Cancer Risk Calculator  Breast Cancer: Medication to Reduce Risk  FRAX Risk Assessment  ICSI Preventive Guidelines  Dietary Guidelines for Americans, 2010  [employer]'s MyPlate  ASA Prophylaxis  Lung CA Screening    Dk Ortiz MD  [facility]    Identified Health Risks:

## 2022-08-04 LAB — HCV AB SERPL QL IA: NONREACTIVE

## 2022-09-08 DIAGNOSIS — E11.22 TYPE 2 DIABETES MELLITUS WITH STAGE 3B CHRONIC KIDNEY DISEASE, WITHOUT LONG-TERM CURRENT USE OF INSULIN (H): Primary | ICD-10-CM

## 2022-09-08 DIAGNOSIS — N18.32 TYPE 2 DIABETES MELLITUS WITH STAGE 3B CHRONIC KIDNEY DISEASE, WITHOUT LONG-TERM CURRENT USE OF INSULIN (H): Primary | ICD-10-CM

## 2022-09-09 RX ORDER — LISINOPRIL 5 MG/1
TABLET ORAL
Qty: 90 TABLET | Refills: 3 | Status: SHIPPED | OUTPATIENT
Start: 2022-09-09 | End: 2023-02-14

## 2022-09-09 NOTE — TELEPHONE ENCOUNTER
"Requested Prescriptions   Pending Prescriptions Disp Refills    lisinopril (ZESTRIL) 5 MG tablet [Pharmacy Med Name: LISINOPRIL 5MG TABLETS] 90 tablet      Sig: TAKE 1 TABLET BY MOUTH EVERY DAY        ACE Inhibitors (Including Combos) Protocol Failed - 9/8/2022  3:01 PM        Failed - Normal serum creatinine on file in past 12 months     Recent Labs   Lab Test 03/10/22  0900   CR 1.32*       Ok to refill medication if creatinine is low          Passed - Blood pressure under 140/90 in past 12 months       BP Readings from Last 3 Encounters:   08/03/22 138/76   02/11/22 134/70                 Passed - Recent (12 mo) or future (30 days) visit within the authorizing provider's specialty     Patient has had an office visit with the authorizing provider or a provider within the authorizing providers department within the previous 12 mos or has a future within next 30 days. See \"Patient Info\" tab in inbasket, or \"Choose Columns\" in Meds & Orders section of the refill encounter.              Passed - Medication is active on med list        Passed - Patient is age 18 or older        Passed - Normal serum potassium on file in past 12 months     Recent Labs   Lab Test 03/10/22  0900 08/21/21  0924   POTASSIUM 4.1  --    [id number]  --  4.6                      Meghna Mitchell RN  "

## 2022-09-20 ENCOUNTER — MYC MEDICAL ADVICE (OUTPATIENT)
Dept: FAMILY MEDICINE | Facility: CLINIC | Age: 82
End: 2022-09-20

## 2022-09-20 DIAGNOSIS — E11.22 TYPE 2 DIABETES MELLITUS WITH STAGE 3B CHRONIC KIDNEY DISEASE, WITHOUT LONG-TERM CURRENT USE OF INSULIN (H): Primary | ICD-10-CM

## 2022-09-20 DIAGNOSIS — N18.32 TYPE 2 DIABETES MELLITUS WITH STAGE 3B CHRONIC KIDNEY DISEASE, WITHOUT LONG-TERM CURRENT USE OF INSULIN (H): Primary | ICD-10-CM

## 2022-10-03 ENCOUNTER — HEALTH MAINTENANCE LETTER (OUTPATIENT)
Age: 82
End: 2022-10-03

## 2022-12-30 ENCOUNTER — NURSE TRIAGE (OUTPATIENT)
Dept: FAMILY MEDICINE | Facility: CLINIC | Age: 82
End: 2022-12-30

## 2022-12-30 NOTE — TELEPHONE ENCOUNTER
"Advised that pt needs to be seen in the clinic    Transferred pt to central scheduling to make an appt  Pt verbalized understanding and agrees to the plan    Denzel Brody RN on 12/30/2022 at 9:33 AM    Reason for Disposition    Pain radiates into the thigh or further down the leg    Answer Assessment - Initial Assessment Questions  1. ONSET: \"When did the pain begin?\"       12/19/22  2. LOCATION: \"Where does it hurt?\" (upper, mid or lower back)      Tailbone area. Right hip area. Aching and sometimes sharp  3. SEVERITY: \"How bad is the pain?\"  (e.g., Scale 1-10; mild, moderate, or severe)    - MILD (1-3): doesn't interfere with normal activities     - MODERATE (4-7): interferes with normal activities or awakens from sleep     - SEVERE (8-10): excruciating pain, unable to do any normal activities       Pain when he is sitting 5/10 9/10 when moving. Worse in the morning  4. PATTERN: \"Is the pain constant?\" (e.g., yes, no; constant, intermittent)       Comes and goes, sharp pain with movement, in right hip  5. RADIATION: \"Does the pain shoot into your legs or elsewhere?\"      Into right hip  6. CAUSE:  \"What do you think is causing the back pain?\"       Not sure. Had this pain a year or so ago.   7. BACK OVERUSE:  \"Any recent lifting of heavy objects, strenuous work or exercise?\"      Using the   8. MEDICATIONS: \"What have you taken so far for the pain?\" (e.g., nothing, acetaminophen, NSAIDS)      Tylenol helps a little bit  9. NEUROLOGIC SYMPTOMS: \"Do you have any weakness, numbness, or problems with bowel/bladder control?\"      no  10. OTHER SYMPTOMS: \"Do you have any other symptoms?\" (e.g., fever, abdominal pain, burning with urination, blood in urine)        Might have more frequency. Pt is trying to drink more water  11. PREGNANCY: \"Is there any chance you are pregnant?\" (e.g., yes, no; LMP)        NA    Protocols used: BACK PAIN-A-OH      "

## 2023-01-06 ENCOUNTER — ANCILLARY PROCEDURE (OUTPATIENT)
Dept: GENERAL RADIOLOGY | Facility: OTHER | Age: 83
End: 2023-01-06
Attending: PHYSICIAN ASSISTANT
Payer: COMMERCIAL

## 2023-01-06 ENCOUNTER — OFFICE VISIT (OUTPATIENT)
Dept: FAMILY MEDICINE | Facility: OTHER | Age: 83
End: 2023-01-06
Payer: COMMERCIAL

## 2023-01-06 VITALS
WEIGHT: 211.5 LBS | HEART RATE: 70 BPM | HEIGHT: 66 IN | RESPIRATION RATE: 16 BRPM | DIASTOLIC BLOOD PRESSURE: 70 MMHG | TEMPERATURE: 97.4 F | OXYGEN SATURATION: 97 % | BODY MASS INDEX: 33.99 KG/M2 | SYSTOLIC BLOOD PRESSURE: 122 MMHG

## 2023-01-06 DIAGNOSIS — M54.50 ACUTE RIGHT-SIDED LOW BACK PAIN WITHOUT SCIATICA: ICD-10-CM

## 2023-01-06 DIAGNOSIS — M54.50 ACUTE RIGHT-SIDED LOW BACK PAIN WITHOUT SCIATICA: Primary | ICD-10-CM

## 2023-01-06 PROCEDURE — 99213 OFFICE O/P EST LOW 20 MIN: CPT | Performed by: PHYSICIAN ASSISTANT

## 2023-01-06 PROCEDURE — 73502 X-RAY EXAM HIP UNI 2-3 VIEWS: CPT | Mod: TC | Performed by: RADIOLOGY

## 2023-01-06 PROCEDURE — 72100 X-RAY EXAM L-S SPINE 2/3 VWS: CPT | Mod: TC | Performed by: RADIOLOGY

## 2023-01-06 RX ORDER — PREDNISONE 20 MG/1
20 TABLET ORAL DAILY
Qty: 5 TABLET | Refills: 0 | Status: SHIPPED | OUTPATIENT
Start: 2023-01-06 | End: 2023-01-11

## 2023-01-06 ASSESSMENT — PAIN SCALES - GENERAL: PAINLEVEL: NO PAIN (0)

## 2023-01-06 NOTE — PATIENT INSTRUCTIONS
- We will let you know the results of the x-ray   - Might want to consider Physical Therapy  - Sent over Prednisone - lower dose, can take if needed, take 1 tablet daily in the morning with food in stomach.    - No more ibuprofen, ok to continue with Tylenol 500 mg every 4-6 hours as needed.   - heat and ice is fine.

## 2023-01-06 NOTE — PROGRESS NOTES
Assessment & Plan     Acute right-sided low back pain without sciatica  Question SI joint or muscular reason for his pain.  Lower suspicion for lumbar etiology such as stenosis and radiculopathy.  Question hip arthritic changes causing more radiation into the back.  He has noted improvement on NSAIDs but discussed his last Creatinine was elevated so would like him to discontinue use.  We will try Prednisone, he notes he has been on in the past, discussed potential side effects of the medication, of note his Type 2 DM is well controlled so feel that there will not be a negative or adverse effect of prednisone burst on diabetes.  Obtained x-ray to rule out severe abnormalities. There are degenerative changes as expected, lower suspicion for hip given mild degenerative changes.  Recommended physical therapy so a referral was placed. Discussed stretching and mobility exercises to help with symptoms but also with leg cramping he gets intermittently.  If persistent pain consider MRI lumbar spine next step.   - XR Lumbar Spine 2/3 Views; Future  - XR Hip Right 2-3 Views; Future  - predniSONE (DELTASONE) 20 MG tablet; Take 1 tablet (20 mg) by mouth daily for 5 days    Return in about 2 weeks (around 1/20/2023) for If not improving, sooner if worse or new concerns.    Options for treatment and follow-up care were reviewed with the patient and/or guardian. Patient and/or guardian engaged in the decision making process and verbalized understanding of the options discussed and agreed with the final plan.    RENAE Ramos [facility] WILFREDO Mantilla is a 82 year old presenting for the following health issues:  Back pain, hip pain, tailbone       History of Present Illness       Reason for visit:  Hip lower back  Symptom onset:  3-4 weeks ago  Symptoms include:  Aching / sharp jabbing pain  Symptom intensity:  Moderate  Symptom progression:  Improving  Had these symptoms before:  Yes  Has  tried/received treatment for these symptoms:  Yes  Previous treatment was successful:  Yes  Prior treatment description:  Chiropracter  What makes it worse:  Sitting/sleeping  What makes it better:  Pills    He eats 2-3 servings of fruits and vegetables daily.He consumes 2 sweetened beverage(s) daily.He exercises with enough effort to increase his heart rate 9 or less minutes per day.  He exercises with enough effort to increase his heart rate 3 or less days per week. He is missing 2 dose(s) of medications per week.  He is not taking prescribed medications regularly due to remembering to take.     Symptoms started about 1 month ago.  He was having right side lower back, hip, buttock region pain.  He went to the chiropractor because he thought he was having severe hip pain.  They did some adjustments to his hip and back and then he felt slight and momentary relief.  But the pain was still present.     Describes the pain as a sharp pain that with certain movements would cause an acute increase in pain and he would wince. This is actually how he think he ended up falling initially. He declines any injuries or worsening back pain after the fall.     He says now the pain is more dull and achy, it is still there but no longer sharp.     No radiation of pain, no paresthesias. No bowel or bladder symptoms or incontinence. Has chronic prostate symptoms occasional decrease in stream of his urine.     Worse: Sitting, position changes sitting to standing, trying to lift the right leg to move, getting in and out of vehicle.   Improved: Tylenol (helped), Ibuprofen (helped better).     Prior symptoms - 1-2 years ago happened, same thing, went to chiropractor and it did finally go away.      Review of Systems   Constitutionalcardiovascular, pulmonary, GI, , musculoskeletal, neuro, skin systems are negative, except as otherwise noted.      Objective    /70   Pulse 70   Temp 97.4  F (36.3  C) (Temporal)   Resp 16   Ht  "1.676 m (5' 6\")   Wt 95.9 kg (211 lb 8 oz)   SpO2 97%   BMI 34.14 kg/m    Body mass index is 34.14 kg/m .  Physical Exam   GENERAL: healthy, alert and no distress  MS: no gross musculoskeletal defects noted, no edema.  Non-tender to palpation over the lumbar spinal processes and bilateral lumbar paraspinal muscles however increased tone noted R>L, there is mild tenderness over the right SI joint negative on the left.  There is no tenderness to palpation over the right side greater trochanter region.  Decreased ROM diffusely in the lumbar spine in all directions without pain noted.  SLR negative bilaterally.  Right hip range of motion passive was relatively intact with some pain noted with CHANDLER on the right.  5/5 lower extremity strength bilaterally.   SKIN: no suspicious lesions or rashes  NEURO: Normal strength and tone, mentation intact and speech normal  PSYCH: mentation appears normal, affect normal/bright    Results for orders placed or performed in visit on 01/06/23   XR Hip Right 2-3 Views     Status: None (Preliminary result)    Narrative    HIP RIGHT TWO TO THREE VIEWS   1/6/2023 10:56 AM     HISTORY: Acute right-sided low back pain without sciatica.    COMPARISON: None.    FINDINGS:  No acute fractures or dislocations. Alignment is anatomic.  Soft tissues are normal.   Hyperostosis of the right superior  acetabulum is noted indicating possible degenerative changes in the  hip. Joint space well-maintained. No acute fracture malalignment.  Phlebolith are noted in the pelvis.      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 No acute  fracture or malalignment.    Results for orders placed or performed in visit on 01/06/23   XR Lumbar Spine 2/3 Views     Status: None (Preliminary result)    Narrative    LUMBAR SPINE TWO TO THREE VIEWS  January 6, 2023 10:57 AM     HISTORY: Acute right-sided low back pain without sciatica.    COMPARISON: None.    FINDINGS: There are five non-rib " bearing lumbar type vertebrae.  Anterior bridging spondylotic spurring with maintenance of the disc  levels is noted and indicates possible diffuse hepatic skeletal  hyperostosis. Facet arthropathy seen from L2 through S1, worst at  L5-S1. No acute fracture or malalignment is identified. Pedicles are  intact. Vertebral body heights are maintained. Mild broad-based  dextroconvex curvature of the thoracolumbar spine is centered at  thoracolumbar junction. Moderate amount stool is projected over colon.  Phleboliths are seen in the pelvis. Mild degenerative changes right  hip are noted.      Impression    IMPRESSION:  1. Probable diffuse idiopathic skeletal hyperostosis.  2. Degenerative changes of the facet joints of the mid to lower spine  are worst at L5-S1.  3. Mild degenerative changes right hip.  4. No acute fracture or significant malalignment.

## 2023-01-12 ENCOUNTER — THERAPY VISIT (OUTPATIENT)
Dept: PHYSICAL THERAPY | Facility: CLINIC | Age: 83
End: 2023-01-12
Attending: PHYSICIAN ASSISTANT
Payer: COMMERCIAL

## 2023-01-12 DIAGNOSIS — M25.551 HIP PAIN, RIGHT: ICD-10-CM

## 2023-01-12 DIAGNOSIS — M54.50 ACUTE RIGHT-SIDED LOW BACK PAIN WITHOUT SCIATICA: ICD-10-CM

## 2023-01-12 PROCEDURE — 97161 PT EVAL LOW COMPLEX 20 MIN: CPT | Mod: GP | Performed by: PHYSICAL THERAPIST

## 2023-01-12 PROCEDURE — 97110 THERAPEUTIC EXERCISES: CPT | Mod: GP | Performed by: PHYSICAL THERAPIST

## 2023-01-12 NOTE — PROGRESS NOTES
[facility]    OUTPATIENT Physical Therapy ORTHOPEDIC EVALUATION  PLAN OF TREATMENT FOR OUTPATIENT REHABILITATION  (COMPLETE FOR INITIAL CLAIMS ONLY)  Patient's Last Name, First Name, M.I.  YOB: 1940  Jose Rafael Gama    Provider s Name:  CHUY[facility]   Medical Record No.  [id number]   Start of Care Date:  01/12/23   Onset Date:   01/06/23 (MD referral)   Treatment Diagnosis:  R LB/ hip pain Medical Diagnosis:     Acute right-sided low back pain without sciatica  Hip pain, right       Goals:     01/12/23 0500   Body Part   Goals listed below are for R LBP/ hip pain   Goal #1   Goal #1 self cares/transfers/bed mobility   Previous Functional Level No restrictions   Current Functional Level Able ;to transfer in and out of a car;to transfer in and out of a bed   Performance Level 5/10 pain   STG Target Performance Be able to ; transfer in and out of a car; transfer in and out of a chair; transfer in and out of a bed   Performance Level 3/10 pain   Rationale for independent self care such as dressing, personal hygiene, bathing;for independent community transportation;for independent living   Due Date 02/23/23   LTG Target Performance Be able to ; transfer in and out of a car; transfer in and out of a chair; transfer in and out of a bed   Performance level without pain   Rationale independence in self cares;for independent community transportation;for independent living   Due Date 04/06/23         Therapy Frequency:  2x/ month  Predicted Duration of Therapy Intervention:  3 month    Seth Abdi, PT                 I CERTIFY THE NEED FOR THESE SERVICES FURNISHED UNDER        THIS PLAN OF TREATMENT AND WHILE UNDER MY CARE     (Physician attestation of this document indicates review and certification of the therapy plan).                     Certification Date From:  01/12/23    Certification Date To:  04/11/23    Referring Provider:  Mary Whitman    Initial Assessment        See Epic Evaluation SOC Date: 01/12/23

## 2023-01-12 NOTE — PROGRESS NOTES
Physical Therapy Initial Evaluation  Subjective:  The history is provided by the patient.   Therapist Generated HPI Evaluation  Problem details: Started about 1 month ago with pain in hip.  Fell x 2 due to pain.  Was sharp pain but changed to achy.   Maybe the same thing last year that went away in 3 weeks.  MD referral 1/6/2023.  .         Type of problem:  Lumbar and sacroiliac (R).    This is a new condition.  Condition occurred with:  Insidious onset.  Where condition occurred: for unknown reasons.  Patient reports pain:  Lumbar spine right, SI joint right and lower lumbar spine.  Pain is described as aching and stabbing and is constant.  Radiates to: R hip  Pain is the same all the time.  Since onset symptoms are unchanged.  Associated with: leg cramps. Symptoms are exacerbated by sitting (rolling in bed;  sit to stand)  and relieved by rest.  Special tests included:  X-ray (degen mid to lower spine;  mild degen in hip).  Previous treatment includes chiropractic (temp relief ).   Work activity restrictions: driving - camper delivery.      Patient Health History  Jose Rafael Gama being seen for R hip/ LB pain.     Date of Onset: beginning of December.   Problem occurred: unknown   Pain is reported as 5/10 on pain scale.  General health as reported by patient is fair.  Pertinent medical history includes: diabetes, high blood pressure and overweight.   Red flags:  Pain at rest/night (cramps at night).     Surgeries include:  Other. Other surgery history details: hernia.    Current medications:  High blood pressure medication and pain medication.    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   Primary job tasks include:  Driving.                                    Objective:  Standing Alignment:        Lumbar:  Normal                           Lumbar/SI Evaluation  ROM:    AROM Lumbar:   Flexion:            Hands to shins - normal per pt  Ext:                    Normal   Side Bend:         Left:  Normal     Right:  Normal with pain  Rotation:           Left:  Normal     Right:  Normal   Side Glide:        Left:     Right:           Lumbar Myotomes:  normal            Lumbar DTR's:  normal          Neural Tension/Mobility:  Neural tension wnl lumbar: tight HS bilat.      Left side:SLR or Slump  negative.     Right side:   Slump or SLR  negative.   Lumbar Palpation:  Palpation (lumbar): lower lumbar.    Tenderness present at Right: Quadratus Lumborum and Erector Spinae      Spinal Segmental Conclusions: Hypomobility in lumbar spine - tested in [facility]          SI joint/Sacrum:    + FABERS on R to R hip                                                       General     ROS    Assessment/Plan:    Patient is a 82 year old male with lumbar complaints.    Patient has the following significant findings with corresponding treatment plan.                Diagnosis 1:  R LBP/ hip pain  Pain -  manual therapy, self management, education, directional preference exercise and home program  Decreased ROM/flexibility - manual therapy and therapeutic exercise  Decreased joint mobility - manual therapy and therapeutic exercise  Decreased strength - therapeutic exercise and therapeutic activities  Impaired muscle performance - neuro re-education  Decreased function - therapeutic activities    Therapy Evaluation Codes:   1) History comprised of:   Personal factors that impact the plan of care:      None.    Comorbidity factors that impact the plan of care are:      Diabetes, High blood pressure and Overweight.     Medications impacting care: High blood pressure and Pain.  2) Examination of Body Systems comprised of:   Body structures and functions that impact the plan of care:      Hip and Lumbar spine.   Activity limitations that impact the plan of care are:      Lifting and Standing.  3) Clinical presentation characteristics are:   Stable/Uncomplicated.  4) Decision-Making    Low complexity using standardized patient  assessment instrument and/or measureable assessment of functional outcome.  Cumulative Therapy Evaluation is: Low complexity.    Previous and current functional limitations:  (See Goal Flow Sheet for this information)    Short term and Long term goals: (See Goal Flow Sheet for this information)     Communication ability:  Patient appears to be able to clearly communicate and understand verbal and written communication and follow directions correctly.  Treatment Explanation - The following has been discussed with the patient:   RX ordered/plan of care  Anticipated outcomes  Possible risks and side effects  This patient would benefit from PT intervention to resume normal activities.   Rehab potential is good.    Frequency:  2 X a month, once daily  Duration:  for 3 months  Discharge Plan:  Achieve all LTG.  Independent in home treatment program.  Reach maximal therapeutic benefit.    Please refer to the daily flowsheet for treatment today, total treatment time and time spent performing 1:1 timed codes.

## 2023-02-11 ENCOUNTER — HEALTH MAINTENANCE LETTER (OUTPATIENT)
Age: 83
End: 2023-02-11

## 2023-02-14 ENCOUNTER — OFFICE VISIT (OUTPATIENT)
Dept: INTERNAL MEDICINE | Facility: CLINIC | Age: 83
End: 2023-02-14
Payer: COMMERCIAL

## 2023-02-14 VITALS
TEMPERATURE: 97.9 F | BODY MASS INDEX: 34.23 KG/M2 | WEIGHT: 213 LBS | OXYGEN SATURATION: 97 % | HEIGHT: 66 IN | SYSTOLIC BLOOD PRESSURE: 128 MMHG | DIASTOLIC BLOOD PRESSURE: 74 MMHG | HEART RATE: 60 BPM | RESPIRATION RATE: 16 BRPM

## 2023-02-14 DIAGNOSIS — E66.811 CLASS 1 OBESITY DUE TO EXCESS CALORIES WITH SERIOUS COMORBIDITY AND BODY MASS INDEX (BMI) OF 34.0 TO 34.9 IN ADULT: ICD-10-CM

## 2023-02-14 DIAGNOSIS — L60.0 INGROWN NAIL: ICD-10-CM

## 2023-02-14 DIAGNOSIS — I10 ESSENTIAL HYPERTENSION: ICD-10-CM

## 2023-02-14 DIAGNOSIS — N18.32 TYPE 2 DIABETES MELLITUS WITH STAGE 3B CHRONIC KIDNEY DISEASE, WITHOUT LONG-TERM CURRENT USE OF INSULIN (H): ICD-10-CM

## 2023-02-14 DIAGNOSIS — E66.09 CLASS 1 OBESITY DUE TO EXCESS CALORIES WITH SERIOUS COMORBIDITY AND BODY MASS INDEX (BMI) OF 34.0 TO 34.9 IN ADULT: ICD-10-CM

## 2023-02-14 DIAGNOSIS — K21.00 GASTROESOPHAGEAL REFLUX DISEASE WITH ESOPHAGITIS, UNSPECIFIED WHETHER HEMORRHAGE: ICD-10-CM

## 2023-02-14 DIAGNOSIS — E11.22 TYPE 2 DIABETES MELLITUS WITH STAGE 3B CHRONIC KIDNEY DISEASE, WITHOUT LONG-TERM CURRENT USE OF INSULIN (H): ICD-10-CM

## 2023-02-14 DIAGNOSIS — G47.62 LEG CRAMPS, SLEEP RELATED: Primary | ICD-10-CM

## 2023-02-14 LAB
ANION GAP SERPL CALCULATED.3IONS-SCNC: 10 MMOL/L (ref 7–15)
BUN SERPL-MCNC: 19.5 MG/DL (ref 8–23)
CALCIUM SERPL-MCNC: 9.8 MG/DL (ref 8.8–10.2)
CHLORIDE SERPL-SCNC: 102 MMOL/L (ref 98–107)
CREAT SERPL-MCNC: 1.4 MG/DL (ref 0.67–1.17)
CREAT UR-MCNC: 108.4 MG/DL
DEPRECATED CALCIDIOL+CALCIFEROL SERPL-MC: 27 UG/L (ref 20–75)
DEPRECATED HCO3 PLAS-SCNC: 25 MMOL/L (ref 22–29)
GFR SERPL CREATININE-BSD FRML MDRD: 51 ML/MIN/1.73M2
GLUCOSE SERPL-MCNC: 128 MG/DL (ref 70–99)
HBA1C MFR BLD: 6 %
MICROALBUMIN UR-MCNC: 155.3 MG/L
MICROALBUMIN/CREAT UR: 143.27 MG/G CR (ref 0–17)
POTASSIUM SERPL-SCNC: 4.4 MMOL/L (ref 3.4–5.3)
SODIUM SERPL-SCNC: 137 MMOL/L (ref 136–145)
VIT B12 SERPL-MCNC: 264 PG/ML (ref 232–1245)

## 2023-02-14 PROCEDURE — 36415 COLL VENOUS BLD VENIPUNCTURE: CPT | Performed by: INTERNAL MEDICINE

## 2023-02-14 PROCEDURE — 99214 OFFICE O/P EST MOD 30 MIN: CPT | Performed by: INTERNAL MEDICINE

## 2023-02-14 PROCEDURE — 82306 VITAMIN D 25 HYDROXY: CPT | Performed by: INTERNAL MEDICINE

## 2023-02-14 PROCEDURE — 82607 VITAMIN B-12: CPT | Performed by: INTERNAL MEDICINE

## 2023-02-14 PROCEDURE — 80048 BASIC METABOLIC PNL TOTAL CA: CPT | Performed by: INTERNAL MEDICINE

## 2023-02-14 PROCEDURE — 83036 HEMOGLOBIN GLYCOSYLATED A1C: CPT | Performed by: INTERNAL MEDICINE

## 2023-02-14 PROCEDURE — 82570 ASSAY OF URINE CREATININE: CPT | Performed by: INTERNAL MEDICINE

## 2023-02-14 PROCEDURE — 82043 UR ALBUMIN QUANTITATIVE: CPT | Performed by: INTERNAL MEDICINE

## 2023-02-14 RX ORDER — FAMOTIDINE 40 MG/1
40 TABLET, FILM COATED ORAL DAILY
Qty: 90 TABLET | Refills: 3 | Status: SHIPPED | OUTPATIENT
Start: 2023-02-14 | End: 2023-10-18

## 2023-02-14 RX ORDER — LISINOPRIL 20 MG/1
20 TABLET ORAL DAILY
Qty: 90 TABLET | Refills: 3 | Status: SHIPPED | OUTPATIENT
Start: 2023-02-14 | End: 2023-10-18 | Stop reason: ALTCHOICE

## 2023-02-14 NOTE — PROGRESS NOTES
Assessment & Plan     Type 2 diabetes mellitus with stage 3b chronic kidney disease, without long-term current use of insulin (H)  Diabetes is doing well A1c is 5.9.  Continue metformin.  Could have some diabetic neuropathy in his big toe but difficult to know on such a small area.  He has had diabetes for 4 to 5 years.  Very well controlled.  - HEMOGLOBIN A1C; Future  - Albumin Random Urine Quantitative with Creat Ratio; Future  - BASIC METABOLIC PANEL; Future  - Vitamin B12; Future  - Orthopedic  Referral; Future    Leg cramps, sleep related  Leg cramps at night he thinks is related to fluid shifts he gets swelling during the day possibly related to amlodipine when he goes to bed his legs get skinny and therefore start to cramp we will cut out his amlodipine recommended compression stockings and elevation of his legs during the day to help the fluid shifts and reduce the cramps.    Essential hypertension  Blood pressure we will stop his amlodipine and increase his lisinopril from 5-20 to get better blood pressure control without the amlodipine.  - lisinopril (ZESTRIL) 20 MG tablet; Take 1 tablet (20 mg) by mouth daily    Class 1 obesity due to excess calories with serious comorbidity and body mass index (BMI) of 34.0 to 34.9 in adult  Obesity needs to work on weight loss we will check his vitamin D with a question of neuropathy symptoms.  - Vitamin D Deficiency; Future    Gastroesophageal reflux disease with esophagitis, unspecified whether hemorrhage  Reflux disease he has not tolerated proton pump inhibitors as he thinks that affects his legs we will put him on famotidine this will work better than his Gaviscon.  - famotidine (PEPCID) 40 MG tablet; Take 1 tablet (40 mg) by mouth daily    Ingrown nail  Nail disease referral to podiatry for possible nail removal.  - Orthopedic  Referral; Future             BMI:   Estimated body mass index is 34.38 kg/m  as calculated from the following:     "Height as of this encounter: 1.676 m (5' 6\").    Weight as of this encounter: 96.6 kg (213 lb).           No follow-ups on file.    Jose Garcia MD  [facility]    Logan Mantilla is a 82 year old, presenting for the following health issues:  Diabetes and Foot Pain (Foot/toe burning)    History of Present Illness       Diabetes:   He presents for follow up of diabetes.  He is checking home blood glucose a few times a month. He checks blood glucose before meals.  Blood glucose is never over 200 and never under 70. When his blood glucose is low, the patient is asymptomatic for confusion, blurred vision, lethargy and reports not feeling dizzy, shaky, or weak.  He is concerned about other.  He is having burning in feet. The patient has not had a diabetic eye exam in the last 12 months.         He eats 2-3 servings of fruits and vegetables daily.He consumes 1 sweetened beverage(s) daily.He exercises with enough effort to increase his heart rate 9 or less minutes per day.  He exercises with enough effort to increase his heart rate 3 or less days per week. He is missing 1 dose(s) of medications per week.  He is not taking prescribed medications regularly due to remembering to take.     He [place] [address].     Toes have been burning, months even a year ago.      Has had diabetes for 4-5 years. On Metformin 1500mg AM and 750 at PM.  Hgba1c is good at 5.9  Glucose 103 today.      Legs swell during the day and then at night gets cramps with his legs getting skinny.        Review of Systems         Objective    /74   Pulse 60   Temp 97.9  F (36.6  C) (Temporal)   Resp 16   Ht 1.676 m (5' 6\")   Wt 96.6 kg (213 lb)   SpO2 97%   BMI 34.38 kg/m    There is no height or weight on file to calculate BMI.  Physical Exam   No acute distress  Both feet have good pulses but cold or cool toes.  Slightly decreased sensation on the bottom of the left big toe.  He does have ingrown toenails on " both of the big toes, some previous nail disease with removal on other toes.  Skin is very dry and scaly.  Swelling at 1+

## 2023-02-15 ENCOUNTER — OFFICE VISIT (OUTPATIENT)
Dept: PODIATRY | Facility: CLINIC | Age: 83
End: 2023-02-15
Attending: INTERNAL MEDICINE
Payer: COMMERCIAL

## 2023-02-15 VITALS
WEIGHT: 213 LBS | HEIGHT: 66 IN | DIASTOLIC BLOOD PRESSURE: 72 MMHG | BODY MASS INDEX: 34.23 KG/M2 | SYSTOLIC BLOOD PRESSURE: 150 MMHG

## 2023-02-15 DIAGNOSIS — N18.32 TYPE 2 DIABETES MELLITUS WITH STAGE 3B CHRONIC KIDNEY DISEASE, WITHOUT LONG-TERM CURRENT USE OF INSULIN (H): ICD-10-CM

## 2023-02-15 DIAGNOSIS — E11.22 TYPE 2 DIABETES MELLITUS WITH STAGE 3B CHRONIC KIDNEY DISEASE, WITHOUT LONG-TERM CURRENT USE OF INSULIN (H): ICD-10-CM

## 2023-02-15 DIAGNOSIS — L60.0 INGROWN NAIL: ICD-10-CM

## 2023-02-15 PROCEDURE — 99203 OFFICE O/P NEW LOW 30 MIN: CPT | Performed by: PODIATRIST

## 2023-02-15 ASSESSMENT — PAIN SCALES - GENERAL: PAINLEVEL: MILD PAIN (2)

## 2023-02-15 NOTE — PROGRESS NOTES
HPI:  Jose Rafael Gama is a 82 year old male who is seen in consultation at the request of Jose Garcia MD    Pt presents for eval of:   (Onset, Location, L/R, Character, Treatments, Injury if yes)    DM type 2     Ongoing ingrown lateral Left great toenail > Right. Left 2nd toenail. Procedure to Left 3rd and 4th toenails, small portion of toenail remains. Left great toe has a burning sensation.     Retired       Review of Systems:  Patient denies fever, chills, rash, wound, stiffness, limping, numbness, weakness, heart burn, blood in stool, chest pain with activity, calf pain when walking, shortness of breath with activity, chronic cough, easy bleeding/bruising, swelling of ankles, excessive thirst, fatigue, depression, anxiety.  Patient admits only to symptoms noted in history.     Patient Active Problem List   Diagnosis     Type 2 diabetes mellitus with stage 3b chronic kidney disease, without long-term current use of insulin (H)     Acute right-sided low back pain without sciatica     Hip pain, right     PAST MEDICAL HISTORY: History reviewed. No pertinent past medical history.  PAST SURGICAL HISTORY: History reviewed. No pertinent surgical history.  MEDICATIONS:   Current Outpatient Medications:      aspirin (ASA) 81 MG chewable tablet, , Disp: , Rfl:      blood glucose (NO BRAND SPECIFIED) test strip, Use to test blood sugar one times daily or as directed., Disp: 100 strip, Rfl: 3     famotidine (PEPCID) 40 MG tablet, Take 1 tablet (40 mg) by mouth daily, Disp: 90 tablet, Rfl: 3     lisinopril (ZESTRIL) 20 MG tablet, Take 1 tablet (20 mg) by mouth daily, Disp: 90 tablet, Rfl: 3     metFORMIN (GLUCOPHAGE-XR) 750 MG 24 hr tablet, TAKE 2 TABLETS BY MOUTH EVERY MORNING AND 1 TABLET BY MOUTH EVERY EVENING., Disp: 270 tablet, Rfl: 3     calcium-vitamin D-vitamin K (VIACTIV) 500-500-40 MG-UNT-MCG CHEW, , Disp: , Rfl:      diclofenac (VOLTAREN) 1 % topical gel, APPLY TOPICALLY TO THE AFFECTED AREA EVERY 6 HOURS AS NEEDED  "FOR SHOULDER PAIN (Patient not taking: Reported on 2/14/2023), Disp: , Rfl:      triamcinolone (KENALOG) 0.1 % external cream, Apply topically 3 times daily (Patient not taking: Reported on 2/14/2023), Disp: 450 g, Rfl: 0     vitamin D3 (CHOLECALCIFEROL) 50 mcg (2000 units) tablet, , Disp: , Rfl:   ALLERGIES:    Allergies   Allergen Reactions     Hydrochlorothiazide W/Triamterene Other (See Comments)     Leg cramps     Omeprazole Muscle Pain (Myalgia)     Cramps   from  Opmerprazole  ?      SOCIAL HISTORY:   Social History     Socioeconomic History     Marital status:      Spouse name: Not on file     Number of children: Not on file     Years of education: Not on file     Highest education level: Not on file   Occupational History     Not on file   Tobacco Use     Smoking status: Never     Smokeless tobacco: Never   Vaping Use     Vaping Use: Never used   Substance and Sexual Activity     Alcohol use: Not Currently     Drug use: Never     Sexual activity: Not Currently   Other Topics Concern     Not on file   Social History Narrative     Not on file     Social Determinants of Health     Financial Resource Strain: Not on file   Food Insecurity: Not on file   Transportation Needs: Not on file   Physical Activity: Not on file   Stress: Not on file   Social Connections: Not on file   Intimate Partner Violence: Not on file   Housing Stability: Not on file     FAMILY HISTORY: History reviewed. No pertinent family history.  EXAM:Vitals: BP (!) 150/72 (BP Location: Left arm, Patient Position: Sitting, Cuff Size: Adult Large)   Ht 1.676 m (5' 6\")   Wt 96.6 kg (213 lb)   BMI 34.38 kg/m    BMI= Body mass index is 34.38 kg/m .    General appearance: Patient is alert and fully cooperative with history & exam.  No sign of distress is noted during the visit.     Psychiatric: Affect is pleasant & appropriate.  Patient appears motivated to improve health.     Respiratory: Breathing is regular & unlabored while sitting.   "   HEENT: Hearing is intact to spoken word.  Speech is clear.  No gross evidence of visual impairment that would impact ambulation.     Vascular: DP 2/4 & PT 2/4 left & right.  CFT immediate.  Temperature warm to warm proximal to distal.  Minor edema and varicosities.  Hair growth is diminished but still present on the foot.  Minimal if any rubor noted.     Neurologic: Normal plantar response bilateral.  Intact protective threshold plus 10/10 applications of a 5.07 monofilament.  Pt admits no burning and paraesthesias about the feet and toes with palpation.     Dermatologic: Left third and fourth toenails have been removed but there is a small spicule noted.  Left medial and lateral hallux nail is incurvated along the borders.  Subtle erythema is noted.  Upon debridement of this no further abscess is noted in the portion of the nail that was bothersome was easily removed with a 6100 blade.  Leaving no abscess.  Very minimally diminished texture turgor tone about the integument.     Musculoskeletal: Patient is ambulatory without assistive device or brace.  There is semi reducible contracture of the lesser digits.    Hemoglobin A1C (%)   Date Value   02/14/2023 6.0 (H)   08/03/2022 5.9 (H)   03/10/2022 6.0 (H)     Creatinine (mg/dL)   Date Value   02/14/2023 1.40 (H)   03/10/2022 1.32 (H)          ASSESSMENT:       ICD-10-CM    1. Type 2 diabetes mellitus with stage 3b chronic kidney disease, without long-term current use of insulin (H)  E11.22 Orthopedic  Referral    N18.32       2. Ingrown nail  L60.0 Orthopedic  Referral           PLAN:    2/15/2023  Minimal debridement today to demonstrate appropriate techniques.  Written instructions for help with nail debridement from the foot care certified nurses dispensed.  If this does not provide adequate relief we may consider permanent matrixectomy and this was discussed with him.  He wishes to attempt further debridement first which is reasonable.  His  risks are not significant as he has no loss of protective threshold and has adequate arterial inflow.    We discussed risk factors and preventive measures.    We discussed appropriate hygiene, shoe gear, daily foot exam, and reinforced management of weight, diet, activity goals and HA1C goal for diabetic patients.    Dispensed written foot care instructions.    All questions were answered to their satisfaction.    If the patient calls in the next few days requesting an antibiotic 1 time that seems reasonable Keflex 500 mg 3 times daily x10 days.  Otherwise follow-up for matrixectomy with any continued symptoms.      J Carlos Rutledge DPM

## 2023-02-15 NOTE — LETTER
2/15/2023         RE: Jose Rafael Gama  [address] [facility] [address]        Dear Colleague,    Thank you for referring your patient, Jose Rafael Gama, to the [facility]. Please see a copy of my visit note below.    HPI:  Jose Rafael Gama is a 82 year old male who is seen in consultation at the request of Jose Garcia MD    Pt presents for eval of:   (Onset, Location, L/R, Character, Treatments, Injury if yes)    DM type 2     Ongoing ingrown lateral Left great toenail > Right. Left 2nd toenail. Procedure to Left 3rd and 4th toenails, small portion of toenail remains. Left great toe has a burning sensation.     Retired       Review of Systems:  Patient denies fever, chills, rash, wound, stiffness, limping, numbness, weakness, heart burn, blood in stool, chest pain with activity, calf pain when walking, shortness of breath with activity, chronic cough, easy bleeding/bruising, swelling of ankles, excessive thirst, fatigue, depression, anxiety.  Patient admits only to symptoms noted in history.     Patient Active Problem List   Diagnosis     Type 2 diabetes mellitus with stage 3b chronic kidney disease, without long-term current use of insulin (H)     Acute right-sided low back pain without sciatica     Hip pain, right     PAST MEDICAL HISTORY: History reviewed. No pertinent past medical history.  PAST SURGICAL HISTORY: History reviewed. No pertinent surgical history.  MEDICATIONS:   Current Outpatient Medications:      aspirin (ASA) 81 MG chewable tablet, , Disp: , Rfl:      blood glucose (NO BRAND SPECIFIED) test strip, Use to test blood sugar one times daily or as directed., Disp: 100 strip, Rfl: 3     famotidine (PEPCID) 40 MG tablet, Take 1 tablet (40 mg) by mouth daily, Disp: 90 tablet, Rfl: 3     lisinopril (ZESTRIL) 20 MG tablet, Take 1 tablet (20 mg) by mouth daily, Disp: 90 tablet, Rfl: 3     metFORMIN (GLUCOPHAGE-XR) 750 MG 24 hr tablet, TAKE 2 TABLETS BY MOUTH EVERY MORNING AND 1  "TABLET BY MOUTH EVERY EVENING., Disp: 270 tablet, Rfl: 3     calcium-vitamin D-vitamin K (VIACTIV) 500-500-40 MG-UNT-MCG CHEW, , Disp: , Rfl:      diclofenac (VOLTAREN) 1 % topical gel, APPLY TOPICALLY TO THE AFFECTED AREA EVERY 6 HOURS AS NEEDED FOR SHOULDER PAIN (Patient not taking: Reported on 2/14/2023), Disp: , Rfl:      triamcinolone (KENALOG) 0.1 % external cream, Apply topically 3 times daily (Patient not taking: Reported on 2/14/2023), Disp: 450 g, Rfl: 0     vitamin D3 (CHOLECALCIFEROL) 50 mcg (2000 units) tablet, , Disp: , Rfl:   ALLERGIES:    Allergies   Allergen Reactions     Hydrochlorothiazide W/Triamterene Other (See Comments)     Leg cramps     Omeprazole Muscle Pain (Myalgia)     Cramps   from  Opmerprazole  ?      SOCIAL HISTORY:   Social History     Socioeconomic History     Marital status:      Spouse name: Not on file     Number of children: Not on file     Years of education: Not on file     Highest education level: Not on file   Occupational History     Not on file   Tobacco Use     Smoking status: Never     Smokeless tobacco: Never   Vaping Use     Vaping Use: Never used   Substance and Sexual Activity     Alcohol use: Not Currently     Drug use: Never     Sexual activity: Not Currently   Other Topics Concern     Not on file   Social History Narrative     Not on file     Social Determinants of Health     Financial Resource Strain: Not on file   Food Insecurity: Not on file   Transportation Needs: Not on file   Physical Activity: Not on file   Stress: Not on file   Social Connections: Not on file   Intimate Partner Violence: Not on file   Housing Stability: Not on file     FAMILY HISTORY: History reviewed. No pertinent family history.  EXAM:Vitals: BP (!) 150/72 (BP Location: Left arm, Patient Position: Sitting, Cuff Size: Adult Large)   Ht 1.676 m (5' 6\")   Wt 96.6 kg (213 lb)   BMI 34.38 kg/m    BMI= Body mass index is 34.38 kg/m .    General appearance: Patient is alert and fully " cooperative with history & exam.  No sign of distress is noted during the visit.     Psychiatric: Affect is pleasant & appropriate.  Patient appears motivated to improve health.     Respiratory: Breathing is regular & unlabored while sitting.     HEENT: Hearing is intact to spoken word.  Speech is clear.  No gross evidence of visual impairment that would impact ambulation.     Vascular: DP 2/4 & PT 2/4 left & right.  CFT immediate.  Temperature warm to warm proximal to distal.  Minor edema and varicosities.  Hair growth is diminished but still present on the foot.  Minimal if any rubor noted.     Neurologic: Normal plantar response bilateral.  Intact protective threshold plus 10/10 applications of a 5.07 monofilament.  Pt admits no burning and paraesthesias about the feet and toes with palpation.     Dermatologic: Left third and fourth toenails have been removed but there is a small spicule noted.  Left medial and lateral hallux nail is incurvated along the borders.  Subtle erythema is noted.  Upon debridement of this no further abscess is noted in the portion of the nail that was bothersome was easily removed with a 6100 blade.  Leaving no abscess.  Very minimally diminished texture turgor tone about the integument.     Musculoskeletal: Patient is ambulatory without assistive device or brace.  There is semi reducible contracture of the lesser digits.    Hemoglobin A1C (%)   Date Value   02/14/2023 6.0 (H)   08/03/2022 5.9 (H)   03/10/2022 6.0 (H)     Creatinine (mg/dL)   Date Value   02/14/2023 1.40 (H)   03/10/2022 1.32 (H)          ASSESSMENT:       ICD-10-CM    1. Type 2 diabetes mellitus with stage 3b chronic kidney disease, without long-term current use of insulin (H)  E11.22 Orthopedic  Referral    N18.32       2. Ingrown nail  L60.0 Orthopedic  Referral           PLAN:    2/15/2023  Minimal debridement today to demonstrate appropriate techniques.  Written instructions for help with nail  debridement from the foot care certified nurses dispensed.  If this does not provide adequate relief we may consider permanent matrixectomy and this was discussed with him.  He wishes to attempt further debridement first which is reasonable.  His risks are not significant as he has no loss of protective threshold and has adequate arterial inflow.    We discussed risk factors and preventive measures.    We discussed appropriate hygiene, shoe gear, daily foot exam, and reinforced management of weight, diet, activity goals and HA1C goal for diabetic patients.    Dispensed written foot care instructions.    All questions were answered to their satisfaction.    If the patient calls in the next few days requesting an antibiotic 1 time that seems reasonable Keflex 500 mg 3 times daily x10 days.  Otherwise follow-up for matrixectomy with any continued symptoms.      J Carlos Rutledge DPM          Again, thank you for allowing me to participate in the care of your patient.        Sincerely,        J Carlos Rutledge DPM

## 2023-03-08 ENCOUNTER — OFFICE VISIT (OUTPATIENT)
Dept: DERMATOLOGY | Facility: CLINIC | Age: 83
End: 2023-03-08
Payer: COMMERCIAL

## 2023-03-08 ENCOUNTER — TELEPHONE (OUTPATIENT)
Dept: DERMATOLOGY | Facility: CLINIC | Age: 83
End: 2023-03-08

## 2023-03-08 DIAGNOSIS — Q80.9 ICHTHYOSIS: ICD-10-CM

## 2023-03-08 DIAGNOSIS — L81.4 LENTIGO: ICD-10-CM

## 2023-03-08 DIAGNOSIS — D18.01 ANGIOMA OF SKIN: ICD-10-CM

## 2023-03-08 DIAGNOSIS — D23.9 DERMAL NEVUS: ICD-10-CM

## 2023-03-08 DIAGNOSIS — L82.1 SEBORRHEIC KERATOSIS: Primary | ICD-10-CM

## 2023-03-08 DIAGNOSIS — L57.0 AK (ACTINIC KERATOSIS): ICD-10-CM

## 2023-03-08 PROCEDURE — 99203 OFFICE O/P NEW LOW 30 MIN: CPT | Performed by: DERMATOLOGY

## 2023-03-08 RX ORDER — FLUOROURACIL 50 MG/G
CREAM TOPICAL
Qty: 40 G | Refills: 1 | Status: SHIPPED | OUTPATIENT
Start: 2023-03-08 | End: 2023-10-18

## 2023-03-08 NOTE — PATIENT INSTRUCTIONS
For dry skin:     Eliminate harsh soaps, i.e. Dial, Zest, [place]is[place] Spring;   Use mild soaps such as Cetaphil or Dove Sensitive Skin   Avoid hot or cold showers   After showering, lightly dry off.    Aggressive use of a moisturizer (including Vanicream, Cetaphil, Aquaphor or Cerave)   We recommend using a tub that needs to be scooped out, not a pump. This has more of an oil base. It will hold moisture in your skin much better than a water base moisturizer. The ones recommended are non- pore clogging.       If you have any questions call [phone] and follow the prompts to Dr. Flores's office.       Using 5-Flurouracil Cream  Apply to face    5-Fluorouracil (5FU) topical cream (brand names Efudex, Carac) is a prescription topical medicine to treat actinic keratoses (pre-squamous cell skin cancer lesions), sun-damaged skin as well as superficial skin cancers.    When applied the areas of sun-damaged skin, the 5FU will  find  damaged skin cells and destroy them.   During treatment, the skin will become red and look very irritated. This is the expected  normal response,  Some patients using 5FU show minimal redness and scaling while others have a very  vigorous  response where the skin scabs and peels. The important thing to realize is that 5FU is treating sun-damaged skin that carries skin cancer risk.      While the skin is irritated, open, sore or scabbed you can apply aquaphor, vaseline or 1% hydrocortisone cream in the morning.     You should apply a thin layer of the cream to the affected area twice a day for 2  weeks every night. A strip of cream the length of your finger tip should be enough to cover your entire face.  For tougher skin like arms, legs, or back, we may suggest longer treatment plans.  However if you react really strong and fast, you might stop earlier or use less frequently. - please call if it is very strong and you are concern you might need to stop early.     If  you prescription coverage allows we may add calcipotriene (Dovonex ), a vitamin-D derivative, to the treatment plan.  In these cases we will have you mix the calcipotriene with the efudex to help shorten the treatment course and improve outcomes.      Typically very strong reactions are related to lots of underlying sun damage, and this means you are getting a good response to the medication. However, there is no need to be miserable while using this. Please let us know if you are having trouble or concerns!      Return to clinic to follow up with the cream in 3 months  Schedule a cyst to be removed.

## 2023-03-08 NOTE — LETTER
3/8/2023         RE: Jose Rafael Gama  [address] [facility] [address]        Dear Colleague,    Thank you for referring your patient, Jose Rafael Gama, to the [facility]. Please see a copy of my visit note below.    Jose Rafael Gama is an extremely pleasant 82 year old year old male patient here today for spots on scalp and chest.   .   Patient states this has been present for a while.  Patient reports the following symptoms:  rough.  Patient reports the following previous treatments none.  These treatments did not work.  Patient reports the following modifying factors none.  Associated symptoms: none.  Hx of ichthyosis .  Patient has no other skin complaints today.  Remainder of the HPI, Meds, PMH, Allergies, FH, and SH was reviewed in chart.    No past medical history on file.    No past surgical history on file.     No family history on file.    Social History     Socioeconomic History     Marital status:      Spouse name: Not on file     Number of children: Not on file     Years of education: Not on file     Highest education level: Not on file   Occupational History     Not on file   Tobacco Use     Smoking status: Never     Smokeless tobacco: Never   Vaping Use     Vaping Use: Never used   Substance and Sexual Activity     Alcohol use: Not Currently     Drug use: Never     Sexual activity: Not Currently   Other Topics Concern     Not on file   Social History Narrative     Not on file     Social Determinants of Health     Financial Resource Strain: Not on file   Food Insecurity: Not on file   Transportation Needs: Not on file   Physical Activity: Not on file   Stress: Not on file   Social Connections: Not on file   Intimate Partner Violence: Not on file   Housing Stability: Not on file       Outpatient Encounter Medications as of 3/8/2023   Medication Sig Dispense Refill     aspirin (ASA) 81 MG chewable tablet        blood glucose (NO BRAND SPECIFIED) test strip Use to test blood sugar  one times daily or as directed. 100 strip 3     calcium-vitamin D-vitamin K (VIACTIV) 500-500-40 MG-UNT-MCG CHEW        diclofenac (VOLTAREN) 1 % topical gel APPLY TOPICALLY TO THE AFFECTED AREA EVERY 6 HOURS AS NEEDED FOR SHOULDER PAIN (Patient not taking: Reported on 2/14/2023)       famotidine (PEPCID) 40 MG tablet Take 1 tablet (40 mg) by mouth daily 90 tablet 3     lisinopril (ZESTRIL) 20 MG tablet Take 1 tablet (20 mg) by mouth daily 90 tablet 3     metFORMIN (GLUCOPHAGE-XR) 750 MG 24 hr tablet TAKE 2 TABLETS BY MOUTH EVERY MORNING AND 1 TABLET BY MOUTH EVERY EVENING. 270 tablet 3     triamcinolone (KENALOG) 0.1 % external cream Apply topically 3 times daily (Patient not taking: Reported on 2/14/2023) 450 g 0     vitamin D3 (CHOLECALCIFEROL) 50 mcg (2000 units) tablet        No facility-administered encounter medications on file as of 3/8/2023.             O:   NAD, WDWN, Alert & Oriented, Mood & Affect wnl, Vitals stable   Here today alone   General appearance normal   Vitals stable   Alert, oriented and in no acute distress     R orbital white nodule   Gritty scaly papule on scalp   ichthyotic scale on trunk   Stuck on papules and brown macules on trunk and ext   Red papules on trunk  Flesh colored papules on trunk  Eyes: Conjunctivae/lids:Normal     ENT: Lips, buccal mucosa, tongue: normal    MSK:Normal    Cardiovascular: peripheral edema none    Pulm: Breathing Normal    Neuro/Psych: Orientation:Alert and Orientedx3 ; Mood/Affect:normal       A/P:  1. Ichthyosis  Skin care discussed with patient   2. Seborrheic keratosis, lentigo, angioma, dermal nevus  3. Epidermal cyst  Excision discussed with patient   4. Actinic keratosis on scalp  Using 5-Flurouracil Cream    5-Fluorouracil (5FU) topical cream (brand names Efudex, Carac) is a prescription topical medicine to treat actinic keratoses (pre-squamous cell skin cancer lesions), sun-damaged skin as well as superficial skin cancers.    When applied the areas  of sun-damaged skin, the 5FU will  find  damaged skin cells and destroy them.   During treatment, the skin will become red and look very irritated. This is the expected  normal response,  Some patients using 5FU show minimal redness and scaling while others have a very  vigorous  response where the skin scabs and peels. The important thing to realize is that 5FU is treating sun-damaged skin that carries skin cancer risk.        While the skin is irritated, open, sore or scabbed you can apply aquaphor, vaseline or 1% hydrocortisone cream in the morning.      You should apply a thin layer of the cream to the affected area twice a day for 2  weeks every night. A strip of cream the length of your finger tip should be enough to cover your entire face.  For tougher skin like arms, legs, or back, we may suggest longer treatment plans.  However if you react really strong and fast, you might stop earlier or use less frequently. - please call if it is very strong and you are concern you might need to stop early.      If you prescription coverage allows we may add calcipotriene (Dovonex ), a vitamin-D derivative, to the treatment plan.  In these cases we will have you mix the calcipotriene with the efudex to help shorten the treatment course and improve outcomes.      Typically very strong reactions are related to lots of underlying sun damage, and this means you are getting a good response to the medication. However, there is no need to be miserable while using this. Please let us know if you are having trouble or concerns!     It was a pleasure speaking to Jose Rafael Gama today.  Previous clinic notes and pertinent laboratory tests were reviewed prior to Jose Rafael Gama's visit.  Nature of benign skin lesions dicussed with patient.  Signs and Symptoms of skin cancer discussed with patient.  Return to clinic 2 months        Again, thank you for allowing me to participate in the care of your patient.        Sincerely,        Rui  Zach Flores MD

## 2023-03-08 NOTE — TELEPHONE ENCOUNTER
[facility] Call Center    Phone Message    May a detailed message be left on voicemail: yes     Reason for Call: Other: Pt states he needs to make a 3 month follow up, per Dr. Flores, please call Pt back to schedule, thanks!      Action Taken: Message routed to:  Other: [place] derm     Travel Screening: Not Applicable

## 2023-03-08 NOTE — TELEPHONE ENCOUNTER
Sent to scheduling to call pt and make follow up appt.  Vianey MARTINEZ RN BSN PHN  [facility]

## 2023-03-08 NOTE — PROGRESS NOTES
3/8/2023 - Initial eval only.  Cancelled next visit and did not reschedule.  Discharge pt from PT.

## 2023-03-08 NOTE — PROGRESS NOTES
Jose Rafael Gama is an extremely pleasant 82 year old year old male patient here today for spots on scalp and chest.   .   Patient states this has been present for a while.  Patient reports the following symptoms:  rough.  Patient reports the following previous treatments none.  These treatments did not work.  Patient reports the following modifying factors none.  Associated symptoms: none.  Hx of ichthyosis .  Patient has no other skin complaints today.  Remainder of the HPI, Meds, PMH, Allergies, FH, and SH was reviewed in chart.    No past medical history on file.    No past surgical history on file.     No family history on file.    Social History     Socioeconomic History     Marital status:      Spouse name: Not on file     Number of children: Not on file     Years of education: Not on file     Highest education level: Not on file   Occupational History     Not on file   Tobacco Use     Smoking status: Never     Smokeless tobacco: Never   Vaping Use     Vaping Use: Never used   Substance and Sexual Activity     Alcohol use: Not Currently     Drug use: Never     Sexual activity: Not Currently   Other Topics Concern     Not on file   Social History Narrative     Not on file     Social Determinants of Health     Financial Resource Strain: Not on file   Food Insecurity: Not on file   Transportation Needs: Not on file   Physical Activity: Not on file   Stress: Not on file   Social Connections: Not on file   Intimate Partner Violence: Not on file   Housing Stability: Not on file       Outpatient Encounter Medications as of 3/8/2023   Medication Sig Dispense Refill     aspirin (ASA) 81 MG chewable tablet        blood glucose (NO BRAND SPECIFIED) test strip Use to test blood sugar one times daily or as directed. 100 strip 3     calcium-vitamin D-vitamin K (VIACTIV) 500-500-40 MG-UNT-MCG CHEW        diclofenac (VOLTAREN) 1 % topical gel APPLY TOPICALLY TO THE AFFECTED AREA EVERY 6 HOURS AS NEEDED FOR SHOULDER PAIN  (Patient not taking: Reported on 2/14/2023)       famotidine (PEPCID) 40 MG tablet Take 1 tablet (40 mg) by mouth daily 90 tablet 3     lisinopril (ZESTRIL) 20 MG tablet Take 1 tablet (20 mg) by mouth daily 90 tablet 3     metFORMIN (GLUCOPHAGE-XR) 750 MG 24 hr tablet TAKE 2 TABLETS BY MOUTH EVERY MORNING AND 1 TABLET BY MOUTH EVERY EVENING. 270 tablet 3     triamcinolone (KENALOG) 0.1 % external cream Apply topically 3 times daily (Patient not taking: Reported on 2/14/2023) 450 g 0     vitamin D3 (CHOLECALCIFEROL) 50 mcg (2000 units) tablet        No facility-administered encounter medications on file as of 3/8/2023.             O:   NAD, WDWN, Alert & Oriented, Mood & Affect wnl, Vitals stable   Here today alone   General appearance normal   Vitals stable   Alert, oriented and in no acute distress     R orbital white nodule   Gritty scaly papule on scalp   ichthyotic scale on trunk   Stuck on papules and brown macules on trunk and ext   Red papules on trunk  Flesh colored papules on trunk  Eyes: Conjunctivae/lids:Normal     ENT: Lips, buccal mucosa, tongue: normal    MSK:Normal    Cardiovascular: peripheral edema none    Pulm: Breathing Normal    Neuro/Psych: Orientation:Alert and Orientedx3 ; Mood/Affect:normal       A/P:  1. Ichthyosis  Skin care discussed with patient   2. Seborrheic keratosis, lentigo, angioma, dermal nevus  3. Epidermal cyst  Excision discussed with patient   4. Actinic keratosis on scalp  Using 5-Flurouracil Cream    5-Fluorouracil (5FU) topical cream (brand names Efudex, Carac) is a prescription topical medicine to treat actinic keratoses (pre-squamous cell skin cancer lesions), sun-damaged skin as well as superficial skin cancers.    When applied the areas of sun-damaged skin, the 5FU will  find  damaged skin cells and destroy them.   During treatment, the skin will become red and look very irritated. This is the expected  normal response,  Some patients using 5FU show minimal redness and  scaling while others have a very  vigorous  response where the skin scabs and peels. The important thing to realize is that 5FU is treating sun-damaged skin that carries skin cancer risk.        While the skin is irritated, open, sore or scabbed you can apply aquaphor, vaseline or 1% hydrocortisone cream in the morning.      You should apply a thin layer of the cream to the affected area twice a day for 2  weeks every night. A strip of cream the length of your finger tip should be enough to cover your entire face.  For tougher skin like arms, legs, or back, we may suggest longer treatment plans.  However if you react really strong and fast, you might stop earlier or use less frequently. - please call if it is very strong and you are concern you might need to stop early.      If you prescription coverage allows we may add calcipotriene (Dovonex ), a vitamin-D derivative, to the treatment plan.  In these cases we will have you mix the calcipotriene with the efudex to help shorten the treatment course and improve outcomes.      Typically very strong reactions are related to lots of underlying sun damage, and this means you are getting a good response to the medication. However, there is no need to be miserable while using this. Please let us know if you are having trouble or concerns!     It was a pleasure speaking to Jose Rafael Gama today.  Previous clinic notes and pertinent laboratory tests were reviewed prior to Jose Rafael Gama's visit.  Nature of benign skin lesions dicussed with patient.  Signs and Symptoms of skin cancer discussed with patient.  Return to clinic 2 months

## 2023-04-04 ENCOUNTER — TELEPHONE (OUTPATIENT)
Dept: DERMATOLOGY | Facility: CLINIC | Age: 83
End: 2023-04-04
Payer: COMMERCIAL

## 2023-04-04 NOTE — TELEPHONE ENCOUNTER
[facility] Call Center    Phone Message    May a detailed message be left on voicemail: yes     Reason for Call: Other: Pt states he has been using fluorouracil (EFUDEX) 5 % external cream [[id number]], ever since he started he has been getting headaches Pt wondering if this is normal? Feels like a toothache pain? Pt would also like to discuss what the cream will do for his condition. Please call Pt back to discuss, thanks!     Action Taken: Message routed to:  Other: [place] [facility]    Travel Screening: Not Applicable

## 2023-04-04 NOTE — TELEPHONE ENCOUNTER
"Spoke to patient.     He has been using the Fluorouracil cream on his scalp since \"Shortly after the appointment\" which was 3-8-23.    Denies blisters or open areas. \"My scalp is blotchy\"     Headaches began \"about a week ago, and I was wondering if it could be from the bright sun out there?..  I don't usually get headaches...\" \"I am taking Tylenol for a Hip and Back issue but it hasn't really helped the headache..\"    Please advise. Stop Fluorouracil?. I did advise he stop it until we speak to Provider (and he has been using it twice a day for over 3 weeks and was to use it for 10 days)    Ijeoma Leiva RN        "

## 2023-05-20 ENCOUNTER — HEALTH MAINTENANCE LETTER (OUTPATIENT)
Age: 83
End: 2023-05-20

## 2023-07-05 ENCOUNTER — PATIENT OUTREACH (OUTPATIENT)
Dept: CARE COORDINATION | Facility: CLINIC | Age: 83
End: 2023-07-05
Payer: COMMERCIAL

## 2023-07-06 ENCOUNTER — OFFICE VISIT (OUTPATIENT)
Dept: PODIATRY | Facility: CLINIC | Age: 83
End: 2023-07-06
Payer: COMMERCIAL

## 2023-07-06 VITALS
HEIGHT: 66 IN | BODY MASS INDEX: 32.78 KG/M2 | DIASTOLIC BLOOD PRESSURE: 82 MMHG | WEIGHT: 204 LBS | SYSTOLIC BLOOD PRESSURE: 152 MMHG

## 2023-07-06 DIAGNOSIS — L60.0 INGROWN NAIL: Primary | ICD-10-CM

## 2023-07-06 DIAGNOSIS — N18.32 TYPE 2 DIABETES MELLITUS WITH STAGE 3B CHRONIC KIDNEY DISEASE, WITHOUT LONG-TERM CURRENT USE OF INSULIN (H): ICD-10-CM

## 2023-07-06 DIAGNOSIS — E11.22 TYPE 2 DIABETES MELLITUS WITH STAGE 3B CHRONIC KIDNEY DISEASE, WITHOUT LONG-TERM CURRENT USE OF INSULIN (H): ICD-10-CM

## 2023-07-06 PROCEDURE — 99213 OFFICE O/P EST LOW 20 MIN: CPT | Performed by: PODIATRIST

## 2023-07-06 ASSESSMENT — PAIN SCALES - GENERAL: PAINLEVEL: NO PAIN (0)

## 2023-07-06 NOTE — LETTER
7/6/2023         RE: Jose Rafael Gama  [address]  [facility] [address]        Dear Colleague,    Thank you for referring your patient, Jose Rafael Gama, to the [facility]. Please see a copy of my visit note below.    Chief Complaint   Patient presents with     Procedure     Matrixectomy lateral Left great toenail     RECHECK     Ingrown lateral lateral Left > Right great toenail and Left 2nd toenail; LOV 2/15/2023     Diabetes     Type 2     HPI:  Jose Rafael Gama is a 82 year old male who is seen in consultation at the request of Jose Garcia MD    Pt presents for eval of:   (Onset, Location, L/R, Character, Treatments, Injury if yes)    DM type 2     Ongoing nails about the borders of both hallux nail.  Has had previous procedures to remove the left third and fourth toenails and is quite satisfied by this.  He has questions about treatment options on his great toenail partial versus total versus other debridement techniques.  These nails are painful all the time but he does not want to have the nail removed today.  He denies drainage.    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  Patient admits only to symptoms noted in history.     Patient Active Problem List   Diagnosis     Type 2 diabetes mellitus with stage 3b chronic kidney disease, without long-term current use of insulin (H)     Acute right-sided low back pain without sciatica     Hip pain, right     PAST MEDICAL HISTORY: History reviewed. No pertinent past medical history.  PAST SURGICAL HISTORY: History reviewed. No pertinent surgical history.  MEDICATIONS:   Current Outpatient Medications:      aspirin (ASA) 81 MG  chewable tablet, , Disp: , Rfl:      blood glucose (NO BRAND SPECIFIED) test strip, Use to test blood sugar one times daily or as directed., Disp: 100 strip, Rfl: 3     calcium-vitamin D-vitamin K (VIACTIV) 500-500-40 MG-UNT-MCG CHEW, , Disp: , Rfl:      famotidine (PEPCID) 40 MG tablet, Take 1 tablet (40 mg) by mouth daily, Disp: 90 tablet, Rfl: 3     lisinopril (ZESTRIL) 20 MG tablet, Take 1 tablet (20 mg) by mouth daily, Disp: 90 tablet, Rfl: 3     metFORMIN (GLUCOPHAGE-XR) 750 MG 24 hr tablet, TAKE 2 TABLETS BY MOUTH EVERY MORNING AND 1 TABLET BY MOUTH EVERY EVENING., Disp: 270 tablet, Rfl: 3     vitamin D3 (CHOLECALCIFEROL) 50 mcg (2000 units) tablet, , Disp: , Rfl:      diclofenac (VOLTAREN) 1 % topical gel, APPLY TOPICALLY TO THE AFFECTED AREA EVERY 6 HOURS AS NEEDED FOR SHOULDER PAIN (Patient not taking: Reported on 2/14/2023), Disp: , Rfl:      fluorouracil (EFUDEX) 5 % external cream, Twice daily to scalp for 10 days (Patient not taking: Reported on 7/6/2023), Disp: 40 g, Rfl: 1     triamcinolone (KENALOG) 0.1 % external cream, Apply topically 3 times daily (Patient not taking: Reported on 2/14/2023), Disp: 450 g, Rfl: 0  ALLERGIES:    Allergies   Allergen Reactions     Hydrochlorothiazide W-Triamterene Other (See Comments)     Leg cramps     Omeprazole Muscle Pain (Myalgia)     Cramps   from  Opmerprazole  ?      SOCIAL HISTORY:   Social History     Socioeconomic History     Marital status:      Spouse name: Not on file     Number of children: Not on file     Years of education: Not on file     Highest education level: Not on file   Occupational History     Not on file   Tobacco Use     Smoking status: Never     Smokeless tobacco: Never   Vaping Use     Vaping Use: Never used   Substance and Sexual Activity     Alcohol use: Not Currently     Drug use: Never     Sexual activity: Not Currently   Other Topics Concern     Not on file   Social History Narrative     Not on file     Social Determinants of  "Health     Financial Resource Strain: Not on file   Food Insecurity: Not on file   Transportation Needs: Not on file   Physical Activity: Not on file   Stress: Not on file   Social Connections: Not on file   Intimate Partner Violence: Not on file   Housing Stability: Not on file     FAMILY HISTORY: History reviewed. No pertinent family history.  EXAM:Vitals: BP (!) 152/82 (BP Location: Left arm, Patient Position: Sitting, Cuff Size: Adult Large)   Ht 1.676 m (5' 6\")   Wt 92.5 kg (204 lb)   BMI 32.93 kg/m    BMI= Body mass index is 32.93 kg/m .    General appearance: Patient is alert and fully cooperative with history & exam.  No sign of distress is noted during the visit.     Psychiatric: Affect is pleasant & appropriate.  Patient appears motivated to improve health.     Respiratory: Breathing is regular & unlabored while sitting.     HEENT: Hearing is intact to spoken word.  Speech is clear.  No gross evidence of visual impairment that would impact ambulation.     Vascular: DP 2/4 & PT 2/4 left & right.  CFT immediate.  Temperature warm to warm proximal to distal.  Minor edema and varicosities.  Hair growth is diminished but still present on the foot.  Minimal if any rubor noted.     Neurologic: Normal plantar response bilateral.  Intact protective threshold plus 10/10 applications of a 5.07 monofilament.  Pt admits no burning and paraesthesias about the feet and toes with palpation.     Dermatologic: Left third and fourth toenails have been removed but there is a small spicule noted.  Left medial and lateral hallux nail is incurvated along the borders.  Subtle erythema is noted.  Upon debridement of this no further abscess is noted in the portion of the nail that was bothersome was easily removed with a 6100 blade.  Leaving no abscess.  Very minimally diminished texture turgor tone about the integument.     Musculoskeletal: Patient is ambulatory without assistive device or brace.  There is semi reducible " contracture of the lesser digits.    Hemoglobin A1C (%)   Date Value   02/14/2023 6.0 (H)   08/03/2022 5.9 (H)   03/10/2022 6.0 (H)     Creatinine (mg/dL)   Date Value   02/14/2023 1.40 (H)   03/10/2022 1.32 (H)     ASSESSMENT:       ICD-10-CM    1. Ingrown nail  L60.0       2. Type 2 diabetes mellitus with stage 3b chronic kidney disease, without long-term current use of insulin (H)  E11.22     N18.32            PLAN:    2/15/2023  Minimal debridement today to demonstrate appropriate techniques.  Written instructions for help with nail debridement from the foot care certified nurses dispensed.  If this does not provide adequate relief we may consider permanent matrixectomy and this was discussed with him.  He wishes to attempt further debridement first which is reasonable.  His risks are not significant as he has no loss of protective threshold and has adequate arterial inflow.    We discussed risk factors and preventive measures.    We discussed appropriate hygiene, shoe gear, daily foot exam, and reinforced management of weight, diet, activity goals and HA1C goal for diabetic patients.    Dispensed written foot care instructions.    All questions were answered to their satisfaction.    If the patient calls in the next few days requesting an antibiotic 1 time that seems reasonable Keflex 500 mg 3 times daily x10 days.  Otherwise follow-up for matrixectomy with any continued symptoms.    7/6/2023  Offered matrixectomy of the affected hallux nail borders.  Discussed the postoperative course.  Discussed appropriate debridement techniques.  Demonstrated these techniques.  If this remains symptomatic would recommend he follow-up for permanent matrixectomy.  Written instructions regarding postoperative phenol matrixectomy dispensed.  We discussed postoperative expectations.  All questions were answered.      J Carlos Rutledge DPM          Again, thank you for allowing me to participate in the care of your patient.         Sincerely,        J Carlos Rutledge DPM

## 2023-07-06 NOTE — PROGRESS NOTES
Chief Complaint   Patient presents with     Procedure     Matrixectomy lateral Left great toenail     RECHECK     Ingrown lateral lateral Left > Right great toenail and Left 2nd toenail; LOV 2/15/2023     Diabetes     Type 2     HPI:  Jose Rafael Gama is a 82 year old male who is seen in consultation at the request of Jose Garcia MD    Pt presents for eval of:   (Onset, Location, L/R, Character, Treatments, Injury if yes)    DM type 2     Ongoing nails about the borders of both hallux nail.  Has had previous procedures to remove the left third and fourth toenails and is quite satisfied by this.  He has questions about treatment options on his great toenail partial versus total versus other debridement techniques.  These nails are painful all the time but he does not want to have the nail removed today.  He denies drainage.    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  Patient admits only to symptoms noted in history.     Patient Active Problem List   Diagnosis     Type 2 diabetes mellitus with stage 3b chronic kidney disease, without long-term current use of insulin (H)     Acute right-sided low back pain without sciatica     Hip pain, right     PAST MEDICAL HISTORY: History reviewed. No pertinent past medical history.  PAST SURGICAL HISTORY: History reviewed. No pertinent surgical history.  MEDICATIONS:   Current Outpatient Medications:      aspirin (ASA) 81 MG chewable tablet, , Disp: , Rfl:      blood glucose (NO BRAND SPECIFIED) test strip, Use to test blood sugar one times daily or as directed., Disp: 100 strip, Rfl: 3     calcium-vitamin D-vitamin K (VIACTIV) 500-500-40 MG-UNT-MCG CHEW, , Disp: ,  Rfl:      famotidine (PEPCID) 40 MG tablet, Take 1 tablet (40 mg) by mouth daily, Disp: 90 tablet, Rfl: 3     lisinopril (ZESTRIL) 20 MG tablet, Take 1 tablet (20 mg) by mouth daily, Disp: 90 tablet, Rfl: 3     metFORMIN (GLUCOPHAGE-XR) 750 MG 24 hr tablet, TAKE 2 TABLETS BY MOUTH EVERY MORNING AND 1 TABLET BY MOUTH EVERY EVENING., Disp: 270 tablet, Rfl: 3     vitamin D3 (CHOLECALCIFEROL) 50 mcg (2000 units) tablet, , Disp: , Rfl:      diclofenac (VOLTAREN) 1 % topical gel, APPLY TOPICALLY TO THE AFFECTED AREA EVERY 6 HOURS AS NEEDED FOR SHOULDER PAIN (Patient not taking: Reported on 2/14/2023), Disp: , Rfl:      fluorouracil (EFUDEX) 5 % external cream, Twice daily to scalp for 10 days (Patient not taking: Reported on 7/6/2023), Disp: 40 g, Rfl: 1     triamcinolone (KENALOG) 0.1 % external cream, Apply topically 3 times daily (Patient not taking: Reported on 2/14/2023), Disp: 450 g, Rfl: 0  ALLERGIES:    Allergies   Allergen Reactions     Hydrochlorothiazide W-Triamterene Other (See Comments)     Leg cramps     Omeprazole Muscle Pain (Myalgia)     Cramps   from  Opmerprazole  ?      SOCIAL HISTORY:   Social History     Socioeconomic History     Marital status:      Spouse name: Not on file     Number of children: Not on file     Years of education: Not on file     Highest education level: Not on file   Occupational History     Not on file   Tobacco Use     Smoking status: Never     Smokeless tobacco: Never   Vaping Use     Vaping Use: Never used   Substance and Sexual Activity     Alcohol use: Not Currently     Drug use: Never     Sexual activity: Not Currently   Other Topics Concern     Not on file   Social History Narrative     Not on file     Social Determinants of Health     Financial Resource Strain: Not on file   Food Insecurity: Not on file   Transportation Needs: Not on file   Physical Activity: Not on file   Stress: Not on file   Social Connections: Not on file   Intimate Partner Violence: Not on  "file   Housing Stability: Not on file     FAMILY HISTORY: History reviewed. No pertinent family history.  EXAM:Vitals: BP (!) 152/82 (BP Location: Left arm, Patient Position: Sitting, Cuff Size: Adult Large)   Ht 1.676 m (5' 6\")   Wt 92.5 kg (204 lb)   BMI 32.93 kg/m    BMI= Body mass index is 32.93 kg/m .    General appearance: Patient is alert and fully cooperative with history & exam.  No sign of distress is noted during the visit.     Psychiatric: Affect is pleasant & appropriate.  Patient appears motivated to improve health.     Respiratory: Breathing is regular & unlabored while sitting.     HEENT: Hearing is intact to spoken word.  Speech is clear.  No gross evidence of visual impairment that would impact ambulation.     Vascular: DP 2/4 & PT 2/4 left & right.  CFT immediate.  Temperature warm to warm proximal to distal.  Minor edema and varicosities.  Hair growth is diminished but still present on the foot.  Minimal if any rubor noted.     Neurologic: Normal plantar response bilateral.  Intact protective threshold plus 10/10 applications of a 5.07 monofilament.  Pt admits no burning and paraesthesias about the feet and toes with palpation.     Dermatologic: Left third and fourth toenails have been removed but there is a small spicule noted.  Left medial and lateral hallux nail is incurvated along the borders.  Subtle erythema is noted.  Upon debridement of this no further abscess is noted in the portion of the nail that was bothersome was easily removed with a 6100 blade.  Leaving no abscess.  Very minimally diminished texture turgor tone about the integument.     Musculoskeletal: Patient is ambulatory without assistive device or brace.  There is semi reducible contracture of the lesser digits.    Hemoglobin A1C (%)   Date Value   02/14/2023 6.0 (H)   08/03/2022 5.9 (H)   03/10/2022 6.0 (H)     Creatinine (mg/dL)   Date Value   02/14/2023 1.40 (H)   03/10/2022 1.32 (H)     ASSESSMENT:       ICD-10-CM  "   1. Ingrown nail  L60.0       2. Type 2 diabetes mellitus with stage 3b chronic kidney disease, without long-term current use of insulin (H)  E11.22     N18.32            PLAN:    2/15/2023  Minimal debridement today to demonstrate appropriate techniques.  Written instructions for help with nail debridement from the foot care certified nurses dispensed.  If this does not provide adequate relief we may consider permanent matrixectomy and this was discussed with him.  He wishes to attempt further debridement first which is reasonable.  His risks are not significant as he has no loss of protective threshold and has adequate arterial inflow.    We discussed risk factors and preventive measures.    We discussed appropriate hygiene, shoe gear, daily foot exam, and reinforced management of weight, diet, activity goals and HA1C goal for diabetic patients.    Dispensed written foot care instructions.    All questions were answered to their satisfaction.    If the patient calls in the next few days requesting an antibiotic 1 time that seems reasonable Keflex 500 mg 3 times daily x10 days.  Otherwise follow-up for matrixectomy with any continued symptoms.    7/6/2023  Offered matrixectomy of the affected hallux nail borders.  Discussed the postoperative course.  Discussed appropriate debridement techniques.  Demonstrated these techniques.  If this remains symptomatic would recommend he follow-up for permanent matrixectomy.  Written instructions regarding postoperative phenol matrixectomy dispensed.  We discussed postoperative expectations.  All questions were answered.      J Carlos Rutledge DPM

## 2023-07-19 ENCOUNTER — PATIENT OUTREACH (OUTPATIENT)
Dept: CARE COORDINATION | Facility: CLINIC | Age: 83
End: 2023-07-19
Payer: COMMERCIAL

## 2023-10-18 ENCOUNTER — OFFICE VISIT (OUTPATIENT)
Dept: FAMILY MEDICINE | Facility: CLINIC | Age: 83
End: 2023-10-18
Payer: COMMERCIAL

## 2023-10-18 VITALS
SYSTOLIC BLOOD PRESSURE: 148 MMHG | DIASTOLIC BLOOD PRESSURE: 80 MMHG | HEIGHT: 66 IN | OXYGEN SATURATION: 96 % | RESPIRATION RATE: 16 BRPM | WEIGHT: 201.8 LBS | HEART RATE: 68 BPM | TEMPERATURE: 97.5 F | BODY MASS INDEX: 32.43 KG/M2

## 2023-10-18 DIAGNOSIS — Z00.00 ENCOUNTER FOR MEDICARE ANNUAL WELLNESS EXAM: ICD-10-CM

## 2023-10-18 DIAGNOSIS — N18.32 TYPE 2 DIABETES MELLITUS WITH STAGE 3B CHRONIC KIDNEY DISEASE, WITHOUT LONG-TERM CURRENT USE OF INSULIN (H): Primary | ICD-10-CM

## 2023-10-18 DIAGNOSIS — E11.22 TYPE 2 DIABETES MELLITUS WITH STAGE 3B CHRONIC KIDNEY DISEASE, WITHOUT LONG-TERM CURRENT USE OF INSULIN (H): Primary | ICD-10-CM

## 2023-10-18 DIAGNOSIS — I10 BENIGN ESSENTIAL HYPERTENSION: ICD-10-CM

## 2023-10-18 LAB
ALBUMIN UR-MCNC: 30 MG/DL
APPEARANCE UR: CLEAR
BILIRUB UR QL STRIP: NEGATIVE
CHOLEST SERPL-MCNC: 147 MG/DL
COLOR UR AUTO: YELLOW
CREAT UR-MCNC: 109.7 MG/DL
GLUCOSE UR STRIP-MCNC: NEGATIVE MG/DL
HBA1C MFR BLD: 5.8 %
HDLC SERPL-MCNC: 33 MG/DL
HGB BLD-MCNC: 15.1 G/DL (ref 13.3–17.7)
HGB UR QL STRIP: ABNORMAL
KETONES UR STRIP-MCNC: NEGATIVE MG/DL
LDLC SERPL CALC-MCNC: 59 MG/DL
LEUKOCYTE ESTERASE UR QL STRIP: NEGATIVE
MICROALBUMIN UR-MCNC: 206 MG/L
MICROALBUMIN/CREAT UR: 187.78 MG/G CR (ref 0–17)
NITRATE UR QL: NEGATIVE
NONHDLC SERPL-MCNC: 114 MG/DL
PH UR STRIP: 5 [PH] (ref 5–7)
SP GR UR STRIP: 1.02 (ref 1–1.03)
TRIGL SERPL-MCNC: 274 MG/DL
UROBILINOGEN UR STRIP-MCNC: NORMAL MG/DL

## 2023-10-18 PROCEDURE — 80061 LIPID PANEL: CPT | Performed by: FAMILY MEDICINE

## 2023-10-18 PROCEDURE — 83036 HEMOGLOBIN GLYCOSYLATED A1C: CPT | Performed by: FAMILY MEDICINE

## 2023-10-18 PROCEDURE — 90480 ADMN SARSCOV2 VAC 1/ONLY CMP: CPT | Performed by: FAMILY MEDICINE

## 2023-10-18 PROCEDURE — 90677 PCV20 VACCINE IM: CPT | Performed by: FAMILY MEDICINE

## 2023-10-18 PROCEDURE — 36415 COLL VENOUS BLD VENIPUNCTURE: CPT | Performed by: FAMILY MEDICINE

## 2023-10-18 PROCEDURE — 85018 HEMOGLOBIN: CPT | Performed by: FAMILY MEDICINE

## 2023-10-18 PROCEDURE — G0439 PPPS, SUBSEQ VISIT: HCPCS | Performed by: FAMILY MEDICINE

## 2023-10-18 PROCEDURE — 82570 ASSAY OF URINE CREATININE: CPT | Performed by: FAMILY MEDICINE

## 2023-10-18 PROCEDURE — 90662 IIV NO PRSV INCREASED AG IM: CPT | Performed by: FAMILY MEDICINE

## 2023-10-18 PROCEDURE — 81003 URINALYSIS AUTO W/O SCOPE: CPT | Performed by: FAMILY MEDICINE

## 2023-10-18 PROCEDURE — 99214 OFFICE O/P EST MOD 30 MIN: CPT | Mod: 25 | Performed by: FAMILY MEDICINE

## 2023-10-18 PROCEDURE — 91320 SARSCV2 VAC 30MCG TRS-SUC IM: CPT | Performed by: FAMILY MEDICINE

## 2023-10-18 PROCEDURE — G0008 ADMIN INFLUENZA VIRUS VAC: HCPCS | Mod: 59 | Performed by: FAMILY MEDICINE

## 2023-10-18 PROCEDURE — 82043 UR ALBUMIN QUANTITATIVE: CPT | Performed by: FAMILY MEDICINE

## 2023-10-18 PROCEDURE — G0009 ADMIN PNEUMOCOCCAL VACCINE: HCPCS | Performed by: FAMILY MEDICINE

## 2023-10-18 RX ORDER — LISINOPRIL AND HYDROCHLOROTHIAZIDE 12.5; 2 MG/1; MG/1
1 TABLET ORAL DAILY
Qty: 90 TABLET | Refills: 3 | Status: SHIPPED | OUTPATIENT
Start: 2023-10-18

## 2023-10-18 RX ORDER — METFORMIN HYDROCHLORIDE 750 MG/1
TABLET, EXTENDED RELEASE ORAL
Qty: 270 TABLET | Refills: 3 | Status: SHIPPED | OUTPATIENT
Start: 2023-10-18

## 2023-10-18 ASSESSMENT — ENCOUNTER SYMPTOMS
PARESTHESIAS: 0
DIZZINESS: 0
CHILLS: 0
DYSURIA: 0
BRUISES/BLEEDS EASILY: 0
FEVER: 0
ARTHRALGIAS: 0
WEAKNESS: 0
NERVOUS/ANXIOUS: 0
SHORTNESS OF BREATH: 0
EYE PAIN: 0
ALLERGIC/IMMUNOLOGIC NEGATIVE: 1
HEADACHES: 0
FREQUENCY: 0
DIARRHEA: 0
PALPITATIONS: 0
ABDOMINAL PAIN: 0
MYALGIAS: 0
SORE THROAT: 0
COUGH: 0
CONSTIPATION: 0

## 2023-10-18 ASSESSMENT — PAIN SCALES - GENERAL: PAINLEVEL: NO PAIN (0)

## 2023-10-18 ASSESSMENT — ACTIVITIES OF DAILY LIVING (ADL): CURRENT_FUNCTION: NO ASSISTANCE NEEDED

## 2023-10-18 NOTE — PROGRESS NOTES
The patient was provided with suggestions to help him develop a healthy physical lifestyle.  He is at risk for lack of exercise and has been provided with information to increase physical activity for the benefit of his well-being.  The patient was counseled and encouraged to consider modifying their diet and eating habits. He was provided with information on recommended healthy diet options.  The patient was provided with written information regarding signs of hearing loss.  Information on urinary incontinence and treatment options given to patient.  The patient was provided with suggestions to help him develop a healthy emotional lifestyle.

## 2023-10-18 NOTE — PATIENT INSTRUCTIONS
Patient Education   Personalized Prevention Plan  You are due for the preventive services outlined below.  Your care team is available to assist you in scheduling these services.  If you have already completed any of these items, please share that information with your care team to update in your medical record.  Health Maintenance Due   Topic Date Due     Diabetic Foot Exam  Never done     Eye Exam  Never done     Urine Test  Never done     Hepatitis B Vaccine (1 of 3 - Risk 3-dose series) Never done     RSV VACCINE 60+ (1 - 1-dose 60+ series) Never done     Diptheria Tetanus Pertussis (DTAP/TDAP/TD) Vaccine (1 - Tdap) 12/04/2007     Pneumococcal Vaccine (2 - PCV) 12/08/2011     Zoster (Shingles) Vaccine (2 of 2) 11/21/2019     A1C Lab  05/14/2023     Cholesterol Lab  08/03/2023     ANNUAL REVIEW OF HM ORDERS  08/03/2023     Kidney Microalbumin Urine Test  08/14/2023     Flu Vaccine (1) 09/01/2023     COVID-19 Vaccine (5 - 2023-24 season) 09/01/2023     Hemoglobin  08/03/2023     Your Health Risk Assessment indicates you feel you are not in good health    A healthy lifestyle helps keep the body fit and the mind alert. It helps protect you from disease, helps you fight disease, and helps prevent chronic disease (disease that doesn't go away) from getting worse. This is important as you get older and begin to notice twinges in muscles and joints and a decline in the strength and stamina you once took for granted. A healthy lifestyle includes good healthcare, good nutrition, weight control, recreation, and regular exercise. Avoid harmful substances and do what you can to keep safe. Another part of a healthy lifestyle is stay mentally active and socially involved.    Good healthcare   Have a wellness visit every year.   If you have new symptoms, let us know right away. Don't wait until the next checkup.   Take medicines exactly as prescribed and keep your medicines in a safe place. Tell us if your medicine causes  "problems.   Healthy diet and weight control   Eat 3 or 4 small, nutritious, low-fat, high-fiber meals a day. Include a variety of fruits, vegetables, and whole-grain foods.   Make sure you get enough calcium in your diet. Calcium, vitamin D, and exercise help prevent osteoporosis (bone thinning).   If you live alone, try eating with others when you can. That way you get a good meal and have company while you eat it.   Try to keep a healthy weight. If you eat more calories than your body uses for energy, it will be stored as fat and you will gain weight.     Recreation   Recreation is not limited to sports and team events. It includes any activity that provides relaxation, interest, enjoyment, and exercise. Recreation provides an outlet for physical, mental, and social energy. It can give a sense of worth and achievement. It can help you stay healthy.    Mental Exercise and Social Involvement  Mental and emotional health is as important as physical health. Keep in touch with friends and family. Stay as active as possible. Continue to learn and challenge yourself.   Things you can do to stay mentally active are:  Learn something new, like a foreign language or musical instrument.   Play SCRABBLE or do crossword puzzles. If you cannot find people to play these games with you at home, you can play them with others on your computer through the Internet.   Join a games club--anything from card games to chess or checkers or lawn bowling.   Start a new hobby.   Go back to school.   Volunteer.   Read.   Keep up with world events.  Learning About Being Physically Active  What is physical activity?     Being physically active means doing any kind of activity that gets your body moving.  The types of physical activity that can help you get fit and stay healthy include:  Aerobic or \"cardio\" activities. These make your heart beat faster and make you breathe harder, such as brisk walking, riding a bike, or running. They strengthen " "your heart and lungs and build up your endurance.  Strength training activities. These make your muscles work against, or \"resist,\" something. Examples include lifting weights or doing push-ups. These activities help tone and strengthen your muscles and bones.  Stretches. These let you move your joints and muscles through their full range of motion. Stretching helps you be more flexible.  Reaching a balance between these three types of physical activity is important because each one contributes to your overall fitness.  What are the benefits of being active?  Being active is one of the best things you can do for your health. It helps you to:  Feel stronger and have more energy to do all the things you like to do.  Focus better at school or work.  Feel, think, and sleep better.  Reach and stay at a healthy weight.  Lose fat and build lean muscle.  Lower your risk for serious health problems, including diabetes, heart attack, high blood pressure, and some cancers.  Keep your heart, lungs, bones, muscles, and joints strong and healthy.  How can you make being active part of your life?  Start slowly. Make it your long-term goal to get at least 30 minutes of exercise on most days of the week. Walking is a good choice. You also may want to do other activities, such as running, swimming, cycling, or playing tennis or team sports.  Pick activities that you like--ones that make your heart beat faster, your muscles stronger, and your muscles and joints more flexible. If you find more than one thing you like doing, do them all. You don't have to do the same thing every day.  Get your heart pumping every day. Any activity that makes your heart beat faster and keeps it at that rate for a while counts.  Here are some great ways to get your heart beating faster:  Go for a brisk walk, run, or bike ride.  Go for a hike or swim.  Go in-line skating.  Play a game of touch football, basketball, or soccer.  Ride a bike.  Play tennis or " "racquetball.  Climb stairs.  Even some household chores can be aerobic--just do them at a faster pace. Vacuuming, raking or mowing the lawn, sweeping the garage, and washing and waxing the car all can help get your heart rate up.  Strengthen your muscles during the week. You don't have to lift heavy weights or grow big, bulky muscles to get stronger. Doing a few simple activities that make your muscles work against, or \"resist,\" something can help you get stronger.  For example, you can:  Do push-ups or sit-ups, which use your own body weight as resistance.  Lift weights or dumbbells or use stretch bands at home or in a gym or community center.  Stretch your muscles often. Stretching will help you as you become more active. It can help you stay flexible, loosen tight muscles, and avoid injury. It can also help improve your balance and posture and can be a great way to relax.  Be sure to stretch the muscles you'll be using when you work out. It's best to warm your muscles slightly before you stretch them. Walk or do some other light aerobic activity for a few minutes, and then start stretching.  When you stretch your muscles:  Do it slowly. Stretching is not about going fast or making sudden movements.  Don't push or bounce during a stretch.  Hold each stretch for at least 15 to 30 seconds, if you can. You should feel a stretch in the muscle, but not pain.  Breathe out as you do the stretch. Then breathe in as you hold the stretch. Don't hold your breath.  If you're worried about how more activity might affect your health, have a checkup before you start. Follow any special advice your doctor gives you for getting a smart start.  Where can you learn more?  Go to https://www.[employer].net/patiented  Enter W332 in the search box to learn more about \"Learning About Being Physically Active.\"  Current as of: October 10, 2022               Content Version: 13.7    5308-3225 [employer].   Care instructions " adapted under license by your healthcare professional. If you have questions about a medical condition or this instruction, always ask your healthcare professional. [employer] disclaims any warranty or liability for your use of this information.      Learning About Dietary Guidelines  What are the Dietary Guidelines for Americans?     Dietary Guidelines for Americans provide tips for eating well and staying healthy. This helps reduce the risk for long-term (chronic) diseases.  These guidelines recommend that you:  Eat and drink the right amount for you. The [place] government's food guide is called MyPlate. It can help you make your own well-balanced eating plan.  Try to balance your eating with your activity. This helps you stay at a healthy weight.  Drink alcohol in moderation, if at all.  Limit foods high in salt, saturated fat, trans fat, and added sugar.  These guidelines are from the [place] Department of Agriculture and the [place] Department of Health and Human Services. They are updated every 5 years.  What is MyPlate?  MyPlate is the [place] government's food guide. It can help you make your own well-balanced eating plan. A balanced eating plan means that you eat enough, but not too much, and that your food gives you the nutrients you need to stay healthy.  MyPlate focuses on eating plenty of whole grains, fruits, and vegetables, and on limiting fat and sugar. It is available online at www.ChooseMyPlate.gov.  How can you get started?  If you're trying to eat healthier, you can slowly change your eating habits over time. You don't have to make big changes all at once. Start by adding one or two healthy foods to your meals each day.  Grains  Choose whole-grain breads and cereals and whole-wheat pasta and whole-grain crackers.  Vegetables  Eat a variety of vegetables every day. They have lots of nutrients and are part of a heart-healthy diet.  Fruits  Eat a variety of fruits every day. Fruits contain lots  "of nutrients. Choose fresh fruit instead of fruit juice.  Protein foods  Choose fish and lean poultry more often. Eat red meat and fried meats less often. Dried beans, tofu, and nuts are also good sources of protein.  Dairy  Choose low-fat or fat-free products from this food group. If you have problems digesting milk, try eating cheese or yogurt instead.  Fats and oils  Limit fats and oils if you're trying to cut calories. Choose healthy fats when you cook. These include canola oil and olive oil.  Where can you learn more?  Go to https://www.[employer].net/patiented  Enter D676 in the search box to learn more about \"Learning About Dietary Guidelines.\"  Current as of: March 1, 2023               Content Version: 13.7 2006-2023 [employer].   Care instructions adapted under license by your healthcare professional. If you have questions about a medical condition or this instruction, always ask your healthcare professional. [employer] disclaims any warranty or liability for your use of this information.      Hearing Loss: Care Instructions  Overview     Hearing loss is a sudden or slow decrease in how well you hear. It can range from slight to profound. Permanent hearing loss can occur with aging. It also can happen when you are exposed long-term to loud noise. Examples include listening to loud music, riding motorcycles, or being around other loud machines.  Hearing loss can affect your work and home life. It can make you feel lonely or depressed. You may feel that you have lost your independence. But hearing aids and other devices can help you hear better and feel connected to others.  Follow-up care is a key part of your treatment and safety. Be sure to make and go to all appointments, and call your doctor if you are having problems. It's also a good idea to know your test results and keep a list of the medicines you take.  How can you care for yourself at home?  Avoid loud noises " whenever possible. This helps keep your hearing from getting worse.  Always wear hearing protection around loud noises.  Wear a hearing aid as directed.  A professional can help you pick a hearing aid that will work best for you.  You can also get hearing aids over the counter for mild to moderate hearing loss.  Have hearing tests as your doctor suggests. They can show whether your hearing has changed. Your hearing aid may need to be adjusted.  Use other devices as needed. These may include:  Telephone amplifiers and hearing aids that can connect to a television, stereo, radio, or microphone.  Devices that use lights or vibrations. These alert you to the doorbell, a ringing telephone, or a baby monitor.  Television closed-captioning. This shows the words at the bottom of the screen. Most new TVs can do this.  TTY (text telephone). This lets you type messages back and forth on the telephone instead of talking or listening. These devices are also called TDD. When messages are typed on the keyboard, they are sent over the phone line to a receiving TTY. The message is shown on a monitor.  Use text messaging, social media, and email if it is hard for you to communicate by telephone.  Try to learn a listening technique called speechreading. It is not lipreading. You pay attention to people's gestures, expressions, posture, and tone of voice. These clues can help you understand what a person is saying. Face the person you are talking to, and have them face you. Make sure the lighting is good. You need to see the other person's face clearly.  Think about counseling if you need help to adjust to your hearing loss.  When should you call for help?  Watch closely for changes in your health, and be sure to contact your doctor if:    You think your hearing is getting worse.     You have new symptoms, such as dizziness or nausea.   Where can you learn more?  Go to https://www.healthwise.net/patiented  Enter R798 in the search box  "to learn more about \"Hearing Loss: Care Instructions.\"  Current as of: March 1, 2023               Content Version: 13.7 2006-2023 [employer].   Care instructions adapted under license by your healthcare professional. If you have questions about a medical condition or this instruction, always ask your healthcare professional. [employer] disclaims any warranty or liability for your use of this information.      Bladder Training: Care Instructions  Your Care Instructions     Bladder training is used to treat urge incontinence and stress incontinence. Urge incontinence means that the need to urinate comes on so fast that you can't get to a toilet in time. Stress incontinence means that you leak urine because of pressure on your bladder. For example, it may happen when you laugh, cough, or lift something heavy.  Bladder training can increase how long you can wait before you have to urinate. It can also help your bladder hold more urine. And it can give you better control over the urge to urinate.  It is important to remember that bladder training takes a few weeks to a few months to make a difference. You may not see results right away, but don't give up.  Follow-up care is a key part of your treatment and safety. Be sure to make and go to all appointments, and call your doctor if you are having problems. It's also a good idea to know your test results and keep a list of the medicines you take.  How can you care for yourself at home?  Work with your doctor to come up with a bladder training program that is right for you. You may use one or more of the following methods.  Delayed urination  In the beginning, try to keep from urinating for 5 minutes after you first feel the need to go.  While you wait, take deep, slow breaths to relax. Kegel exercises can also help you delay the need to go to the bathroom.  After some practice, when you can easily wait 5 minutes to urinate, try to wait 10 " "minutes before you urinate.  Slowly increase the waiting period until you are able to control when you have to urinate.  Scheduled urination  Empty your bladder when you first wake up in the morning.  Schedule times throughout the day when you will urinate.  Start by going to the bathroom every hour, even if you don't need to go.  Slowly increase the time between trips to the bathroom.  When you have found a schedule that works well for you, keep doing it.  If you wake up during the night and have to urinate, do it. Apply your schedule to waking hours only.  Kegel exercises  These tighten and strengthen pelvic muscles, which can help you control the flow of urine. (If doing these exercises causes pain, stop doing them and talk with your doctor.) To do Kegel exercises:  Squeeze your muscles as if you were trying not to pass gas. Or squeeze your muscles as if you were stopping the flow of urine. Your belly, legs, and buttocks shouldn't move.  Hold the squeeze for 3 seconds, then relax for 5 to 10 seconds.  Start with 3 seconds, then add 1 second each week until you are able to squeeze for 10 seconds.  Repeat the exercise 10 times a session. Do 3 to 8 sessions a day.  When should you call for help?  Watch closely for changes in your health, and be sure to contact your doctor if:    Your incontinence is getting worse.     You do not get better as expected.   Where can you learn more?  Go to https://www.[employer].net/patiented  Enter V684 in the search box to learn more about \"Bladder Training: Care Instructions.\"  Current as of: March 1, 2023               Content Version: 13.7    8674-9696 [employer].   Care instructions adapted under license by your healthcare professional. If you have questions about a medical condition or this instruction, always ask your healthcare professional. [employer] disclaims any warranty or liability for your use of this information.      Your Health Risk " Assessment indicates you feel you are not in good emotional health.    Recreation   Recreation is not limited to sports and team events. It includes any activity that provides relaxation, interest, enjoyment, and exercise. Recreation provides an outlet for physical, mental, and social energy. It can give a sense of worth and achievement. It can help you stay healthy.    Mental Exercise and Social Involvement  Mental and emotional health is as important as physical health. Keep in touch with friends and family. Stay as active as possible. Continue to learn and challenge yourself.   Things you can do to stay mentally active are:  Learn something new, like a foreign language or musical instrument.   Play SCRABBLE or do crossword puzzles. If you cannot find people to play these games with you at home, you can play them with others on your computer through the Internet.   Join a games club--anything from card games to chess or checkers or lawn bowling.   Start a new hobby.   Go back to school.   Volunteer.   Read.   Keep up with world events.

## 2023-10-18 NOTE — PROGRESS NOTES
"SUBJECTIVE:   Jose Rafael is a 83 year old who presents for Preventive Visit.      10/18/2023     3:53 PM   Additional Questions   Roomed by Zara BOOGIE.       Are you in the first 12 months of your Medicare coverage?  No    Healthy Habits:     In general, how would you rate your overall health?  Fair    Frequency of exercise:  None    Do you usually eat at least 4 servings of fruit and vegetables a day, include whole grains    & fiber and avoid regularly eating high fat or \"junk\" foods?  No    Taking medications regularly:  Yes    Ability to successfully perform activities of daily living:  No assistance needed    Home Safety:  No safety concerns identified    Hearing Impairment:  Difficulty following a conversation in a noisy restaurant or crowded room, feel that people are mumbling or not speaking clearly, difficult to understand a speaker at a public meeting or [employer] service, need to ask people to speak up or repeat themselves and difficulty understanding soft or whispered speech    In the past 6 months, have you been bothered by leaking of urine? Yes    In general, how would you rate your overall mental or emotional health?  Fair    Additional concerns today:  Yes      Today's PHQ-2 Score:       10/18/2023     3:47 PM   PHQ-2 ( 1999 [employer])   Q1: Little interest or pleasure in doing things 0   Q2: Feeling down, depressed or hopeless 1   PHQ-2 Score 1   Q1: Little interest or pleasure in doing things Not at all   Q2: Feeling down, depressed or hopeless Several days   PHQ-2 Score 1           Have you ever done Advance Care Planning? (For example, a Health Directive, POLST, or a discussion with a medical provider or your loved ones about your wishes): No, advance care planning information given to patient to review.  Patient plans to discuss their wishes with loved ones or provider.         Fall risk  Fallen 2 or more times in the past year?: No  Any fall with injury in the past year?: No    Cognitive Screening   1) " Repeat 3 items (Leader, Season, Table)    2) Clock draw:  Normal clock, incorrect minute hand  3) 3 item recall: Recalls 3 objects  Results: NORMAL clock, 1-2 items recalled: COGNITIVE IMPAIRMENT LESS LIKELY    Mini-CogTM Copyright TYRA Crowell. Licensed by the author for use in [facility]; reprinted with permission (sue@[facility]). All rights reserved.      Do you have sleep apnea, excessive snoring or daytime drowsiness? : Daytime drowsiness    Reviewed and updated as needed this visit by clinical staff   Tobacco  Allergies  Meds  Problems  Med Hx  Surg Hx  Fam Hx          Reviewed and updated as needed this visit by Provider   Tobacco  Allergies  Meds  Problems  Med Hx  Surg Hx  Fam Hx         Social History     Tobacco Use    Smoking status: Never    Smokeless tobacco: Never   Substance Use Topics    Alcohol use: Not Currently             10/18/2023     3:45 PM   Alcohol Use   Prescreen: >3 drinks/day or >7 drinks/week? No     Do you have a current opioid prescription? No  Do you use any other controlled substances or medications that are not prescribed by a provider? None          Diabetes Follow-up    How often are you checking your blood sugar? Not at all  What concerns do you have today about your diabetes? None   Do you have any of these symptoms? (Select all that apply)  Numbness in feet  Have you had a diabetic eye exam in the last 12 months? No        BP Readings from Last 2 Encounters:   10/18/23 (!) 148/80   07/06/23 (!) 152/82     Hemoglobin A1C (%)   Date Value   10/18/2023 5.8 (H)   02/14/2023 6.0 (H)     LDL Cholesterol Calculated (mg/dL)   Date Value   10/18/2023 59   08/03/2022 46           Current providers sharing in care for this patient include:   Patient Care Team:  Dk Ortiz MD as PCP - General (Family Medicine)  Dk Ortiz MD as Assigned PCP  Rui Flores MD as MD (Dermatology)  J Carlos Rutledge DPM as Assigned Surgical Provider    The  following health maintenance items are reviewed in Epic and correct as of today:  Health Maintenance   Topic Date Due    DIABETIC FOOT EXAM  Never done    EYE EXAM  Never done    URINALYSIS  Never done    HEPATITIS B IMMUNIZATION (1 of 3 - Risk 3-dose series) Never done    RSV VACCINE 60+ (1 - 1-dose 60+ series) Never done    DTAP/TDAP/TD IMMUNIZATION (1 - Tdap) 12/04/2007    Pneumococcal Vaccine: 65+ Years (2 - PCV) 12/08/2011    ZOSTER IMMUNIZATION (2 of 2) 11/21/2019    LIPID  08/03/2023    A1C  08/14/2023    MICROALBUMIN  08/14/2023    INFLUENZA VACCINE (1) 09/01/2023    COVID-19 Vaccine (5 - 2023-24 season) 09/01/2023    HEMOGLOBIN  08/03/2023    BMP  02/14/2024    MEDICARE ANNUAL WELLNESS VISIT  10/18/2024    ANNUAL REVIEW OF HM ORDERS  10/18/2024    FALL RISK ASSESSMENT  10/18/2024    ADVANCE CARE PLANNING  10/18/2028    PARATHYROID  Completed    PHOSPHORUS  Completed    PHQ-2 (once per calendar year)  Completed    ALK PHOS  Completed    IPV IMMUNIZATION  Aged Out    HPV IMMUNIZATION  Aged Out    MENINGITIS IMMUNIZATION  Aged Out     Lab work is in process  Labs reviewed in EPIC  BP Readings from Last 3 Encounters:   10/18/23 (!) 148/80   07/06/23 (!) 152/82   02/15/23 (!) 150/72    Wt Readings from Last 3 Encounters:   10/18/23 91.5 kg (201 lb 12.8 oz)   07/06/23 92.5 kg (204 lb)   02/15/23 96.6 kg (213 lb)                  Patient Active Problem List   Diagnosis    Type 2 diabetes mellitus with stage 3b chronic kidney disease, without long-term current use of insulin (H)    Acute right-sided low back pain without sciatica    Hip pain, right     History reviewed. No pertinent surgical history.    Social History     Tobacco Use    Smoking status: Never    Smokeless tobacco: Never   Substance Use Topics    Alcohol use: Not Currently     History reviewed. No pertinent family history.      Current Outpatient Medications   Medication Sig Dispense Refill    aspirin (ASA) 81 MG chewable tablet       calcium-vitamin  "D-vitamin K (VIACTIV) 500-500-40 MG-UNT-MCG CHEW       lisinopril (ZESTRIL) 20 MG tablet Take 1 tablet (20 mg) by mouth daily 90 tablet 3    metFORMIN (GLUCOPHAGE-XR) 750 MG 24 hr tablet TAKE 2 TABLETS BY MOUTH EVERY MORNING AND 1 TABLET BY MOUTH EVERY EVENING. 270 tablet 3     Allergies   Allergen Reactions    Hydrochlorothiazide W-Triamterene Other (See Comments)     Leg cramps    Omeprazole Muscle Pain (Myalgia)     Cramps   from  Opmerprazole  ?      Recent Labs   Lab Test 02/14/23  0814 08/03/22  1715 03/10/22  0900   A1C 6.0* 5.9* 6.0*   LDL  --  46  --    HDL  --  27*  --    TRIG  --  306*  --    ALT  --   --  29   CR 1.40*  --  1.32*   GFRESTIMATED 51*  --  55*   POTASSIUM 4.4  --  4.1   TSH  --   --  4.71*              Review of Systems   Constitutional:  Negative for chills and fever.   HENT:  Negative for congestion, ear pain, hearing loss and sore throat.    Eyes:  Negative for pain and visual disturbance.   Respiratory:  Negative for cough and shortness of breath.    Cardiovascular:  Negative for chest pain, palpitations and peripheral edema.   Gastrointestinal:  Negative for abdominal pain, constipation and diarrhea.   Endocrine: Negative for polyuria.   Genitourinary:  Positive for impotence and urgency. Negative for dysuria and frequency.   Musculoskeletal:  Negative for arthralgias and myalgias.   Skin:  Negative for rash.   Allergic/Immunologic: Negative.    Neurological:  Negative for dizziness, weakness, headaches and paresthesias.   Hematological:  Does not bruise/bleed easily.   Psychiatric/Behavioral:  Negative for mood changes. The patient is not nervous/anxious.          OBJECTIVE:   BP (!) 148/80   Pulse 68   Temp 97.5  F (36.4  C) (Temporal)   Resp 16   Ht 1.676 m (5' 6\")   Wt 91.5 kg (201 lb 12.8 oz)   SpO2 96%   BMI 32.57 kg/m   Estimated body mass index is 32.57 kg/m  as calculated from the following:    Height as of this encounter: 1.676 m (5' 6\").    Weight as of this " encounter: 91.5 kg (201 lb 12.8 oz).  Physical Exam  GENERAL: healthy, alert, no distress, and over weight  NECK: no adenopathy, no asymmetry, masses, or scars and thyroid normal to palpation  RESP: lungs clear to auscultation - no rales, rhonchi or wheezes  CV: regular rate and rhythm, normal S1 S2, no S3 or S4, no murmur, click or rub, no peripheral edema and peripheral pulses strong  ABDOMEN: soft, nontender, no hepatosplenomegaly, no masses and bowel sounds normal  MS: no gross musculoskeletal defects noted, no edema  NEURO: Normal strength and tone, mentation intact and speech normal  PSYCH: mentation appears normal, affect normal/bright  LYMPH: no cervical, supraclavicular, axillary, or inguinal adenopathy  Diabetic foot exam: normal DP and PT pulses, no trophic changes or ulcerative lesions, and normal sensory exam    Diagnostic Test Results:  Labs reviewed in Epic    ASSESSMENT / PLAN:       ICD-10-CM    1. Type 2 diabetes mellitus with stage 3b chronic kidney disease, without long-term current use of insulin (H)  E11.22 UA Macroscopic with reflex to Microscopic and Culture    N18.32 HEMOGLOBIN A1C     Lipid panel reflex to direct LDL Non-fasting     Albumin Random Urine Quantitative with Creat Ratio     Hemoglobin     metFORMIN (GLUCOPHAGE-XR) 750 MG 24 hr tablet     lisinopril-hydrochlorothiazide (ZESTORETIC) 20-12.5 MG tablet     UA Macroscopic with reflex to Microscopic and Culture     HEMOGLOBIN A1C     Lipid panel reflex to direct LDL Non-fasting     Albumin Random Urine Quantitative with Creat Ratio     Hemoglobin      2. Encounter for Medicare annual wellness exam  Z00.00       3. Benign essential hypertension  I10 lisinopril-hydrochlorothiazide (ZESTORETIC) 20-12.5 MG tablet          Patient has been advised of split billing requirements and indicates understanding: Yes  1. Encounter for Medicare annual wellness exam      2. Type 2 diabetes mellitus with stage 3b chronic kidney disease, without  "long-term current use of insulin (H)  Chronic, controlled. The current medical regimen is effective;  continue present plan and medications. Will obtain diabetic eye exam   - UA Macroscopic with reflex to Microscopic and Culture; Future  - HEMOGLOBIN A1C; Future  - Lipid panel reflex to direct LDL Non-fasting; Future  - Albumin Random Urine Quantitative with Creat Ratio; Future  - Hemoglobin; Future  - metFORMIN (GLUCOPHAGE-XR) 750 MG 24 hr tablet; TAKE 2 TABLETS BY MOUTH EVERY MORNING AND 1 TABLET BY MOUTH EVERY EVENING.  Dispense: 270 tablet; Refill: 3  - lisinopril-hydrochlorothiazide (ZESTORETIC) 20-12.5 MG tablet; Take 1 tablet by mouth daily  Dispense: 90 tablet; Refill: 3  - UA Macroscopic with reflex to Microscopic and Culture  - HEMOGLOBIN A1C  - Lipid panel reflex to direct LDL Non-fasting  - Albumin Random Urine Quantitative with Creat Ratio  - Hemoglobin    3. Benign essential hypertension  Chronic, uncontrolled. Currently on lisinopril for DM. BP elevated today. Switch to Zestoretic and recheck BP in 1 month.  - lisinopril-hydrochlorothiazide (ZESTORETIC) 20-12.5 MG tablet; Take 1 tablet by mouth daily  Dispense: 90 tablet; Refill: 3        COUNSELING:  Reviewed preventive health counseling, as reflected in patient instructions       Regular exercise       Healthy diet/nutrition       Vision screening       Dental care       Fall risk prevention       Immunizations  Vaccinated for: Covid-19, Influenza, and Pneumococcal and Declined: TDAP and Zoster due to Other will obtain through local pharmacy             Aspirin prophylaxis       BMI:   Estimated body mass index is 32.57 kg/m  as calculated from the following:    Height as of this encounter: 1.676 m (5' 6\").    Weight as of this encounter: 91.5 kg (201 lb 12.8 oz).   Weight management plan: Discussed healthy diet and exercise guidelines      He reports that he has never smoked. He has never used smokeless tobacco.      Appropriate preventive services " were discussed with this patient, including applicable screening as appropriate for fall prevention, nutrition, physical activity, Tobacco-use cessation, weight loss and cognition.  Checklist reviewing preventive services available has been given to the patient.    Reviewed patients plan of care and provided an AVS. The Basic Care Plan (routine screening as documented in Health Maintenance) for Jose Rafael meets the Care Plan requirement. This Care Plan has been established and reviewed with the Patient.          Dk Ortiz MD  [facility]    Identified Health Risks:  I have reviewed Opioid Use Disorder and Substance Use Disorder risk factors and made any needed referrals.

## 2023-11-21 ENCOUNTER — ALLIED HEALTH/NURSE VISIT (OUTPATIENT)
Dept: FAMILY MEDICINE | Facility: CLINIC | Age: 83
End: 2023-11-21
Payer: COMMERCIAL

## 2023-11-21 VITALS — DIASTOLIC BLOOD PRESSURE: 82 MMHG | SYSTOLIC BLOOD PRESSURE: 138 MMHG

## 2023-11-21 DIAGNOSIS — I10 BENIGN ESSENTIAL HYPERTENSION: Primary | ICD-10-CM

## 2023-11-21 PROCEDURE — 99207 PR NO CHARGE NURSE ONLY: CPT

## 2023-11-21 NOTE — PROGRESS NOTES
Jose Rafael Gama is a 83 year old patient who comes in today for a Blood Pressure check.  Initial BP:  /82      Data Unavailable  Disposition: follow-up as previously indicated by provider and results routed to provider    Nicolasa Boss MA 11/21/2023

## 2024-04-26 DIAGNOSIS — E11.22 TYPE 2 DIABETES MELLITUS WITH STAGE 3B CHRONIC KIDNEY DISEASE, WITHOUT LONG-TERM CURRENT USE OF INSULIN (H): Primary | ICD-10-CM

## 2024-04-26 DIAGNOSIS — N18.32 TYPE 2 DIABETES MELLITUS WITH STAGE 3B CHRONIC KIDNEY DISEASE, WITHOUT LONG-TERM CURRENT USE OF INSULIN (H): Primary | ICD-10-CM

## 2024-05-18 ENCOUNTER — HEALTH MAINTENANCE LETTER (OUTPATIENT)
Age: 84
End: 2024-05-18

## 2024-05-26 ENCOUNTER — HOSPITAL ENCOUNTER (EMERGENCY)
Facility: CLINIC | Age: 84
Discharge: HOME | End: 2024-05-27
Attending: FAMILY MEDICINE | Admitting: FAMILY MEDICINE
Payer: COMMERCIAL

## 2024-05-26 VITALS
HEIGHT: 66 IN | SYSTOLIC BLOOD PRESSURE: 171 MMHG | HEART RATE: 69 BPM | OXYGEN SATURATION: 96 % | BODY MASS INDEX: 34.07 KG/M2 | TEMPERATURE: 98.1 F | WEIGHT: 212 LBS | RESPIRATION RATE: 20 BRPM | DIASTOLIC BLOOD PRESSURE: 87 MMHG

## 2024-05-26 DIAGNOSIS — M54.50 ACUTE RIGHT-SIDED LOW BACK PAIN WITHOUT SCIATICA: ICD-10-CM

## 2024-05-26 PROCEDURE — 99283 EMERGENCY DEPT VISIT LOW MDM: CPT | Performed by: FAMILY MEDICINE

## 2024-05-26 PROCEDURE — 99284 EMERGENCY DEPT VISIT MOD MDM: CPT | Performed by: FAMILY MEDICINE

## 2024-05-26 RX ORDER — ACETAMINOPHEN 500 MG
1000 TABLET ORAL EVERY 6 HOURS PRN
COMMUNITY

## 2024-05-26 ASSESSMENT — COLUMBIA-SUICIDE SEVERITY RATING SCALE - C-SSRS
6. HAVE YOU EVER DONE ANYTHING, STARTED TO DO ANYTHING, OR PREPARED TO DO ANYTHING TO END YOUR LIFE?: NO
2. HAVE YOU ACTUALLY HAD ANY THOUGHTS OF KILLING YOURSELF IN THE PAST MONTH?: NO
1. IN THE PAST MONTH, HAVE YOU WISHED YOU WERE DEAD OR WISHED YOU COULD GO TO SLEEP AND NOT WAKE UP?: NO

## 2024-05-27 PROCEDURE — 250N000012 HC RX MED GY IP 250 OP 636 PS 637: Performed by: FAMILY MEDICINE

## 2024-05-27 PROCEDURE — 250N000013 HC RX MED GY IP 250 OP 250 PS 637: Performed by: FAMILY MEDICINE

## 2024-05-27 RX ORDER — CYCLOBENZAPRINE HCL 10 MG
10 TABLET ORAL 3 TIMES DAILY PRN
Status: DISCONTINUED | OUTPATIENT
Start: 2024-05-27 | End: 2024-05-27 | Stop reason: HOSPADM

## 2024-05-27 RX ORDER — CYCLOBENZAPRINE HCL 10 MG
10 TABLET ORAL 3 TIMES DAILY PRN
Qty: 4 TABLET | Refills: 0 | Status: SHIPPED | OUTPATIENT
Start: 2024-05-27 | End: 2024-05-27

## 2024-05-27 RX ORDER — CYCLOBENZAPRINE HCL 10 MG
10 TABLET ORAL 3 TIMES DAILY PRN
Qty: 15 TABLET | Refills: 0 | Status: SHIPPED | OUTPATIENT
Start: 2024-05-27

## 2024-05-27 RX ORDER — PREDNISONE 20 MG/1
20 TABLET ORAL DAILY
Qty: 5 TABLET | Refills: 0 | Status: SHIPPED | OUTPATIENT
Start: 2024-05-28 | End: 2024-06-02

## 2024-05-27 RX ORDER — PREDNISONE 20 MG/1
20 TABLET ORAL ONCE
Status: COMPLETED | OUTPATIENT
Start: 2024-05-27 | End: 2024-05-27

## 2024-05-27 RX ADMIN — CYCLOBENZAPRINE 10 MG: 10 TABLET, FILM COATED ORAL at 00:48

## 2024-05-27 RX ADMIN — PREDNISONE 20 MG: 20 TABLET ORAL at 00:40

## 2024-05-27 NOTE — ED PROVIDER NOTES
History     Chief Complaint   Patient presents with    Back Pain     HPI  Jose Rafael Gama is a 83 year old male who presents to the ED with right lower back pain that is worsened over the past couple of days.  He does lots of driving and thinks maybe the seat in his truck is bad and bouncing over rough roads makes it worse.  Does not recall any specific injury.  He will get a sharp stabbing pain if he moves wrong.  Had a similar exacerbation back in January 2023.  Went to physical therapy 1 time and had a short course of prednisone 20 mg daily which he thinks helps.  He has been using some Tylenol.  He is supposed to avoid anti-inflammatories.    Lumbar x-rays done back then which showed some degenerative changes in the facets and also hyperostosis.  Settled down and he did not even need any more advanced imaging.  He has had no fevers chills or sweats.  No loss of control of his bladder or bowels.  No pain down the legs.    Is quite active and does lots of work in the yard.  He bought a new teller and was wrestling around with that today as well which may have stirred things up also.  He took 3000 mg of Tylenol in divided doses today with slight relief.  It is lots of leg cramps as well.  That is nothing new.    To a chiropractor a few weeks ago for his shoulder.  With his wife, Monika.  Diabetes is well-controlled.    Allergies:  Allergies   Allergen Reactions    Omeprazole Muscle Pain (Myalgia)     Cramps   from  Opmerprazole  ?        Problem List:    Patient Active Problem List    Diagnosis Date Noted    Acute right-sided low back pain without sciatica 01/12/2023     Priority: Medium    Hip pain, right 01/12/2023     Priority: Medium    Type 2 diabetes mellitus with stage 3b chronic kidney disease, without long-term current use of insulin (H) 02/11/2022     Priority: Medium        Past Medical History:    History reviewed. No pertinent past medical history.    Past Surgical History:    History reviewed. No  "pertinent surgical history.    Family History:    No family history on file.    Social History:  Marital Status:   [2]  Social History     Tobacco Use    Smoking status: Never    Smokeless tobacco: Never   Vaping Use    Vaping status: Never Used   Substance Use Topics    Alcohol use: Not Currently    Drug use: Never        Medications:    acetaminophen (TYLENOL) 500 MG tablet  aspirin (ASA) 81 MG chewable tablet  calcium-vitamin D-vitamin K (VIACTIV) 500-500-40 MG-UNT-MCG CHEW  cyclobenzaprine (FLEXERIL) 10 MG tablet  lisinopril-hydrochlorothiazide (ZESTORETIC) 20-12.5 MG tablet  metFORMIN (GLUCOPHAGE-XR) 750 MG 24 hr tablet  [START ON 5/28/2024] predniSONE (DELTASONE) 20 MG tablet  blood glucose (NO BRAND SPECIFIED) test strip          Review of Systems   All other systems reviewed and are negative.      Physical Exam   BP: (!) 171/87  Pulse: 69  Temp: 98.1  F (36.7  C)  Resp: 20  Height: 167.6 cm (5' 6\")  Weight: 96.2 kg (212 lb)  SpO2: 96 %      Physical Exam  Constitutional:       Appearance: Normal appearance.   Musculoskeletal:      Lumbar back: Spasms (mild) and tenderness (mild diffuse) present. Decreased range of motion (all planes quite limited,).   Neurological:      Mental Status: He is alert.         ED Course        Procedures              Critical Care time:  none               No results found for this or any previous visit (from the past 24 hour(s)).    Medications   cyclobenzaprine (FLEXERIL) tablet 10 mg (has no administration in time range)   predniSONE (DELTASONE) tablet 20 mg (20 mg Oral $Given 5/27/24 0040)       Assessments & Plan (with Medical Decision Making)  83-year-old with recurrence of his low back pain worsened over the last couple of days.  Thinks it might be related to lots of driving or perhaps doing lots of yard work and wrestling with his tiller.  Gets sharp stabbing pain if he moves wrong.  No radiation down the legs and no bladder or bowel symptoms.  No fevers.  Diabetes " is well-controlled.  He is supposed to avoid anti-inflammatories.  Had a flare back in January 2023 that settled down with a short course of low-dose prednisone.  He was given 20 mg today and a prescription for 5 additional days.  Will trial Flexeril at bedtime and continue with Tylenol.  Avoid narcotics for now with his advanced age.  He will follow-up in clinic for reevaluation if persistent problems.  He does not think he found physical therapy to be particularly helpful but would reconsider it.  I would be very hesitant to have chiropractic manipulation of his lower back with his hyperostosis.  Verbal and written discharge instructions given.  He and his wife are comfortable with this plan.     I have reviewed the nursing notes.    I have reviewed the findings, diagnosis, plan and need for follow up with the patient.           Medical Decision Making  The patient's presentation was of low complexity (an acute and uncomplicated illness or injury).    The patient's evaluation involved:  review of external note(s) from 1 sources (see separate area of note for details)  review of 1 test result(s) ordered prior to this encounter (see separate area of note for details)    The patient's management necessitated moderate risk (prescription drug management including medications given in the ED).        New Prescriptions    CYCLOBENZAPRINE (FLEXERIL) 10 MG TABLET    Take 1 tablet (10 mg) by mouth 3 times daily as needed for muscle spasms    PREDNISONE (DELTASONE) 20 MG TABLET    Take 1 tablet (20 mg) by mouth daily for 5 days       Final diagnoses:   Acute right-sided low back pain without sciatica       5/26/2024   [facility] EMERGENCY DEPT       Dain Knowles MD  05/27/24 0047

## 2024-05-27 NOTE — DISCHARGE INSTRUCTIONS
You can continue with the Tylenol as needed for pain.  Flexeril as needed for spasm.  Be careful as it can cause drowsiness.  Take the prednisone as directed.  Follow-up with Dr. Ortiz this coming week if persistent problems.  Return to the ED if you develop fever of 100.4, loss of control of your bladder or bowels, increasing pain uncontrolled by oral medications or any concerns.  It was nice visiting with both of you.  I hope this settles down quickly for you.    Thank you for choosing [facility]. We appreciate the opportunity to meet your urgent medical needs. Please let us know if we could have done anything to make your stay more satisfying.    After discharge, please closely monitor for any new or worsening symptoms. Return to the Emergency Department if you develop any acute worsening signs or symptoms.    If you had lab work, cultures or imaging studies done during your stay, the final results may still be pending. We will call you if your plan of care needs to change. However, if you are not improving as expected, please follow up with your primary care provider or clinic.     Start any prescription medications that were prescribed to you and take them as directed.     Please see additional handouts that may be pertinent to your condition.

## 2024-08-08 ENCOUNTER — MYC MEDICAL ADVICE (OUTPATIENT)
Dept: FAMILY MEDICINE | Facility: CLINIC | Age: 84
End: 2024-08-08
Payer: COMMERCIAL

## 2024-08-08 ENCOUNTER — NURSE TRIAGE (OUTPATIENT)
Dept: FAMILY MEDICINE | Facility: CLINIC | Age: 84
End: 2024-08-08
Payer: COMMERCIAL

## 2024-08-08 NOTE — TELEPHONE ENCOUNTER
Provider Response to 2nd Level Triage Request    I have reviewed the RN documentation. My recommendation is:  Face To Face Visit. Next available clinic visit with me.  Dk Ortiz MD

## 2024-08-08 NOTE — TELEPHONE ENCOUNTER
Nurse Triage SBAR    Is this a 2nd Level Triage? NO    Situation:  MyChart for severe leg cramps. Asking about quinine     Background: Patient with hx of neuropathy.     Assessment:  Patient has struggled with leg cramping for 15 years. He says they are worse at night. He feels them above his knee, on his calf, and in his feet. He describes moments laying in bed his feet cramp up and he can't straighten them out to stand up. Denies calf swelling, pain, or warmth. He says he takes Gaviscon PRN to help with acid reflux. On nights he takes Gaviscon the cramps are the worse. 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 He says his shoes aren't that new. He states he struggles to find comfortable shoes. He denies active leg cramp. Advised to find supportive shoes.     Protocol Recommended Disposition:   Home Care    Recommendation: Patient inquiring about quinine for leg cramps. Please advise if patient needs appointment.     Routed to provider    Herve Hernandez RN on 8/8/2024 at 2:59 PM      Does the patient meet one of the following criteria for ADS visit consideration? 16+ years old, with an [facility] PCP     TIP  Providers, please consider if this condition is appropriate for management at one of our Acute and Diagnostic Services sites.     If patient is a good candidate, please use dotphrase <dot>triageresponse and select Refer to ADS to document.      Reason for Disposition   Leg pain    Additional Information   Negative: MODERATE pain (e.g., interferes with normal activities, limping) and present > 3 days   Negative: Swollen joint with no fever or redness   Negative: Leg pain which occurs after walking a certain distance and disappears with rest, AND age > 50   Negative: Leg pain in shins (front of lower legs) and it occurs with running or jumping exercise (e.g., jogging, basketball)   Negative: MILD pain (e.g., does not interfere with normal activities) and present > 7  days   Negative: Leg pain or muscle cramp is a chronic symptom (recurrent or ongoing AND lasting > 4 weeks)   Negative: Numbness in a leg or foot (i.e., loss of sensation)   Negative: Localized pain, redness or hard lump along vein   Negative: Patient wants to be seen   Negative: Painful rash with multiple small blisters grouped together (i.e., dermatomal distribution or 'band' or 'stripe')   Negative: Looks like a boil, infected sore, deep ulcer, or other infected rash (spreading redness, pus)   Negative: Localized rash is very painful (no fever)   Negative: Fever and red area (or area very tender to touch)   Negative: Swollen joint and fever   Negative: Thigh or calf pain in only one leg and present > 1 hour   Negative: Thigh, calf, or ankle swelling in only one leg   Negative: Thigh, calf, or ankle swelling in both legs, but one side is definitely more swollen   Negative: History of prior 'blood clot' in leg or lungs (i.e., deep vein thrombosis, pulmonary embolism)   Negative: History of inherited increased risk of blood clots (e.g., factor 5 Leiden, antithrombin 3, protein C or protein S deficiency, prothrombin mutation)   Negative: Major surgery in past month   Negative: Hip or leg fracture (broken bone) in past month (or had cast on leg or ankle in past month)   Negative: Illness requiring prolonged bedrest in past month (e.g., immobilization, long hospital stay)   Negative: Long-distance travel in past month (e.g., car, bus, train, plane; with trip lasting 6 or more hours)   Negative: Cancer treatment in past six months (or has cancer now)   Negative: Patient sounds very sick or weak to the triager   Negative: SEVERE pain (e.g., excruciating, unable to do any normal activities)   Negative: Cast on leg or ankle and now has increasing pain   Negative: Red area or streak > 2 inches (or 5 cm)   Negative: Chest pain   Negative: Difficulty breathing   Negative: Followed a hip injury   Negative: Followed a knee  injury   Negative: Followed an ankle or foot injury   Negative: Back pain radiating (shooting) into leg(s)   Negative: Foot pain is main symptom   Negative: Ankle pain is main symptom   Negative: Knee pain is main symptom   Negative: Leg swelling is main symptom   Negative: Entire foot is cool or blue in comparison to other side   Negative: Unable to walk   Negative: Looks like a broken bone or dislocated joint (e.g., crooked or deformed)   Negative: Sounds like a life-threatening emergency to the triager    Protocols used: Leg Pain-A-[place]

## 2024-08-08 NOTE — TELEPHONE ENCOUNTER
RN Triage    Patient Contact    Attempt # 1    RN did attempt to reach patient. No answer. Message left for patient to call the clinic back and ask to speak to a triage nurse.     Calling patient to attempt to schedule an appt with PCP    Svetlana Magaña RN on 8/8/2024 at 3:45 PM

## 2024-08-12 NOTE — TELEPHONE ENCOUNTER
RN Triage    Patient Contact    Attempt # 2    Was call answered?  No.  Left message on voicemail with information to call me back. DId send [email or web address].

## 2024-08-13 NOTE — TELEPHONE ENCOUNTER
Called patient and notified him of provider advice, per below note. He states he has been having these leg cramps for years so he will just wait to discuss further during his annual wellness visit in October. Advised him to call back sooner if he changes his mind and would like to schedule a clinic visit. He verbalized understanding and had no further questions or concerns.     Beulah Valdovinos, BSN, RN

## 2024-09-09 ENCOUNTER — OFFICE VISIT (OUTPATIENT)
Dept: PODIATRY | Facility: CLINIC | Age: 84
End: 2024-09-09
Payer: COMMERCIAL

## 2024-09-09 VITALS
SYSTOLIC BLOOD PRESSURE: 130 MMHG | DIASTOLIC BLOOD PRESSURE: 72 MMHG | BODY MASS INDEX: 33.43 KG/M2 | WEIGHT: 208 LBS | HEIGHT: 66 IN

## 2024-09-09 DIAGNOSIS — E11.22 TYPE 2 DIABETES MELLITUS WITH STAGE 3B CHRONIC KIDNEY DISEASE, WITHOUT LONG-TERM CURRENT USE OF INSULIN (H): ICD-10-CM

## 2024-09-09 DIAGNOSIS — N18.32 TYPE 2 DIABETES MELLITUS WITH STAGE 3B CHRONIC KIDNEY DISEASE, WITHOUT LONG-TERM CURRENT USE OF INSULIN (H): ICD-10-CM

## 2024-09-09 DIAGNOSIS — L60.0 INGROWN NAIL: Primary | ICD-10-CM

## 2024-09-09 PROCEDURE — 11750 EXCISION NAIL&NAIL MATRIX: CPT | Mod: TA | Performed by: PODIATRIST

## 2024-09-09 PROCEDURE — 11750 EXCISION NAIL&NAIL MATRIX: CPT | Mod: 51 | Performed by: PODIATRIST

## 2024-09-09 ASSESSMENT — PAIN SCALES - GENERAL: PAINLEVEL: NO PAIN (0)

## 2024-09-09 NOTE — LETTER
9/9/2024      Jose Rafael Gama  [address] [place] [address]      Dear Colleague,    Thank you for referring your patient, Jose Rafael Gama, to the [facility]. Please see a copy of my visit note below.    Chief Complaint   Patient presents with     RECHECK     Ingrown lateral lateral Left > Right great toenail and medial Left 2nd toenail; LOV 7/6/2023     Diabetes     Type 2     Procedure     Matrixectomy lateral Left great and medial Left 2nd toenail     HPI:  Jose Rafael Gama is a 82 year old male who is seen in consultation at the request of Jose Garcia MD    Pt presents for eval of:   (Onset, Location, L/R, Character, Treatments, Injury if yes)    DM type 2       Ongoing nails about the borders of both hallux nail.  Has had previous procedures to remove the left third and fourth toenails and is quite satisfied by this.  He has questions about treatment options on his great toenail partial versus total versus other debridement techniques.  These nails are painful all the time but he does not want to have the nail removed today.  He denies drainage.    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     Review of Systems:  Patient denies fever, chills, rash, wound, stiffness, limping, numbness, weakness, heart burn, blood in stool, chest pain with activity, calf pain when walking, shortness of breath with activity, chronic cough, easy bleeding/bruising, swelling of ankles, excessive thirst, fatigue, depression, anxiety.  Patient admits only to symptoms noted in history.     Patient Active Problem List   Diagnosis     Type 2 diabetes mellitus with stage 3b chronic kidney disease, without long-term current use of insulin (H)     Acute right-sided low back pain without sciatica     Hip pain, right     PAST MEDICAL HISTORY: History reviewed. No pertinent past medical history.  PAST SURGICAL HISTORY: History reviewed. No pertinent surgical history.  MEDICATIONS:   Current Outpatient Medications:       acetaminophen (TYLENOL) 500 MG tablet, Take 1,000 mg by mouth every 6 hours as needed for mild pain, Disp: , Rfl:      aspirin (ASA) 81 MG chewable tablet, , Disp: , Rfl:      blood glucose (NO BRAND SPECIFIED) test strip, Use to test blood sugar once time, daily., Disp: 100 strip, Rfl: 3     calcium-vitamin D-vitamin K (VIACTIV) 500-500-40 MG-UNT-MCG CHEW, , Disp: , Rfl:      lisinopril-hydrochlorothiazide (ZESTORETIC) 20-12.5 MG tablet, Take 1 tablet by mouth daily, Disp: 90 tablet, Rfl: 3     metFORMIN (GLUCOPHAGE-XR) 750 MG 24 hr tablet, TAKE 2 TABLETS BY MOUTH EVERY MORNING AND 1 TABLET BY MOUTH EVERY EVENING., Disp: 270 tablet, Rfl: 3     cyclobenzaprine (FLEXERIL) 10 MG tablet, Take 1 tablet (10 mg) by mouth 3 times daily as needed for muscle spasms (Patient not taking: Reported on 9/9/2024), Disp: 15 tablet, Rfl: 0  ALLERGIES:    Allergies   Allergen Reactions     Omeprazole Muscle Pain (Myalgia)     Cramps   from  Opmerprazole  ?      SOCIAL HISTORY:   Social History     Socioeconomic History     Marital status:      Spouse name: Not on file     Number of children: Not on file     Years of education: Not on file     Highest education level: Not on file   Occupational History     Not on file   Tobacco Use     Smoking status: Never     Smokeless tobacco: Never   Vaping Use     Vaping status: Never Used   Substance and Sexual Activity     Alcohol use: Not Currently     Drug use: Never     Sexual activity: Not Currently   Other Topics Concern     Not on file   Social History Narrative     Not on file     Social Determinants of Health     Financial Resource Strain: Low Risk  (10/18/2023)    Financial Resource Strain      Within the past 12 months, have you or your family members you live with been unable to get utilities (heat, electricity) when it was really needed?: No   Food Insecurity: Low Risk  (10/18/2023)    Food Insecurity      Within the past 12 months, did you worry that your food would run out  "before you got money to buy more?: No      Within the past 12 months, did the food you bought just not last and you didn t have money to get more?: No   Transportation Needs: Low Risk  (10/18/2023)    Transportation Needs      Within the past 12 months, has lack of transportation kept you from medical appointments, getting your medicines, non-medical meetings or appointments, work, or from getting things that you need?: No   Physical Activity: Not on file   Stress: Not on file   Social Connections: Not on file   Interpersonal Safety: Low Risk  (10/18/2023)    Interpersonal Safety      Do you feel physically and emotionally safe where you currently live?: Yes      Within the past 12 months, have you been hit, slapped, kicked or otherwise physically hurt by someone?: No      Within the past 12 months, have you been humiliated or emotionally abused in other ways by your partner or ex-partner?: No   Housing Stability: Low Risk  (10/18/2023)    Housing Stability      Do you have housing? : Yes      Are you worried about losing your housing?: No     FAMILY HISTORY: History reviewed. No pertinent family history.  EXAM:Vitals: /72 (BP Location: Left arm, Patient Position: Sitting, Cuff Size: Adult Regular)   Ht 1.67 m (5' 5.75\")   Wt 94.3 kg (208 lb)   BMI 33.83 kg/m    BMI= Body mass index is 33.83 kg/m .    General appearance: Patient is alert and fully cooperative with history & exam.  No sign of distress is noted during the visit.     Psychiatric: Affect is pleasant & appropriate.  Patient appears motivated to improve health.     Respiratory: Breathing is regular & unlabored while sitting.     HEENT: Hearing is intact to spoken word.  Speech is clear.  No gross evidence of visual impairment that would impact ambulation.     Vascular: DP 2/4 & PT 2/4 left & right.  CFT immediate.  Temperature warm to warm proximal to distal.  Minor edema and varicosities.  Hair growth is diminished but still present on the foot. "  Minimal if any rubor noted.     Neurologic: Normal plantar response bilateral.  Intact protective threshold plus 10/10 applications of a 5.07 monofilament.  Pt admits no burning and paraesthesias about the feet and toes with palpation.     Dermatologic: Left third and fourth toenails have been removed but there is a small spicule noted.  Left lateral hallux nail and medial left second toenail are incurvated along the borders.  Subtle erythema is noted.  Discomfort is noted with palpation very minimally diminished texture turgor tone about the integument.     Musculoskeletal: Patient is ambulatory without assistive device or brace.  There is semi reducible contracture of the lesser digits.    Hemoglobin A1C (%)   Date Value   10/18/2023 5.8 (H)   02/14/2023 6.0 (H)   08/03/2022 5.9 (H)   03/10/2022 6.0 (H)     Creatinine (mg/dL)   Date Value   02/14/2023 1.40 (H)   03/10/2022 1.32 (H)     ASSESSMENT:       ICD-10-CM    1. Ingrown nail  L60.0       2. Type 2 diabetes mellitus with stage 3b chronic kidney disease, without long-term current use of insulin (H)  E11.22     N18.32           PLAN:    2/15/2023  Minimal debridement today to demonstrate appropriate techniques.  Written instructions for help with nail debridement from the foot care certified nurses dispensed.  If this does not provide adequate relief we may consider permanent matrixectomy and this was discussed with him.  He wishes to attempt further debridement first which is reasonable.  His risks are not significant as he has no loss of protective threshold and has adequate arterial inflow.    We discussed risk factors and preventive measures.    We discussed appropriate hygiene, shoe gear, daily foot exam, and reinforced management of weight, diet, activity goals and HA1C goal for diabetic patients.    Dispensed written foot care instructions.    All questions were answered to their satisfaction.    If the patient calls in the next few days requesting an  antibiotic 1 time that seems reasonable Keflex 500 mg 3 times daily x10 days.  Otherwise follow-up for matrixectomy with any continued symptoms.    7/6/2023  Offered matrixectomy of the affected hallux nail borders.  Discussed the postoperative course.  Discussed appropriate debridement techniques.  Demonstrated these techniques.  If this remains symptomatic would recommend he follow-up for permanent matrixectomy.  Written instructions regarding postoperative phenol matrixectomy dispensed.  We discussed postoperative expectations.  All questions were answered.    9/9/2024  Patient is requesting permanent matrixectomy of the left lateral hallux and left medial second toenail.  We reviewed medical history and EPIC chart.  After obtaining informed consent to permanently remove left lateral hallux and medial left 2nd toenail borders. , I utilized 3 cc of lidocaine plain to achieve local anesthesia per digit.  The toenails were then prepped with Betadine in usual fashion.  A quarter inch Penrose drain was then utilized for hemostasis.  100% of the toenail border was avulsed utilizing a nail elevator, english anvil, 6100 blade and hemostat.  The proximal root portion of the nail was confirmed to be removed atraumatically.  Three applications of 89% phenol were applied to the surgical site for 30 seconds each followed by a curette after each application.  Surgical site was then flushed with alcohol and dressed with bacitracin and a nonadherent compression dressing.  Tourniquet was removed after approximately 3 minutes followed by immediate hyperemia to the distal aspect of the hallux.  Written postoperative instructions were dispensed and patient instructed to follow up in 1-2 weeks with any questions, pain,drainage, delayed healing or concerns.  I answered all questions to patients satisfaction.     If patient calls in the next 1-3 weeks with continued redness, pain or drainage I would recommend beginning oral Keflex 500  mg, 4 times a day ×10 days, after confirming there is no allergy.       J Carlos Rutledge D.P.M.                Again, thank you for allowing me to participate in the care of your patient.        Sincerely,        J Carlos Rutledge DPM

## 2024-09-09 NOTE — PROGRESS NOTES
Chief Complaint   Patient presents with    RECHECK     Ingrown lateral lateral Left > Right great toenail and medial Left 2nd toenail; LOV 7/6/2023    Diabetes     Type 2    Procedure     Matrixectomy lateral Left great and medial Left 2nd toenail     HPI:  Jose Rafael Gama is a 82 year old male who is seen in consultation at the request of Jose Garcia MD    Pt presents for eval of:   (Onset, Location, L/R, Character, Treatments, Injury if yes)    DM type 2       Ongoing nails about the borders of both hallux nail.  Has had previous procedures to remove the left third and fourth toenails and is quite satisfied by this.  He has questions about treatment options on his great toenail partial versus total versus other debridement techniques.  These nails are painful all the time but he does not want to have the nail removed today.  He denies drainage.    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     Review of Systems:  Patient denies fever, chills, rash, wound, stiffness, limping, numbness, weakness, heart burn, blood in stool, chest pain with activity, calf pain when walking, shortness of breath with activity, chronic cough, easy bleeding/bruising, swelling of ankles, excessive thirst, fatigue, depression, anxiety.  Patient admits only to symptoms noted in history.     Patient Active Problem List   Diagnosis    Type 2 diabetes mellitus with stage 3b chronic kidney disease, without long-term current use of insulin (H)    Acute right-sided low back pain without sciatica    Hip pain, right     PAST MEDICAL HISTORY: History reviewed. No pertinent past medical history.  PAST SURGICAL HISTORY: History reviewed. No pertinent surgical history.  MEDICATIONS:   Current Outpatient Medications:     acetaminophen (TYLENOL) 500 MG tablet, Take 1,000 mg by mouth every 6 hours as needed for mild pain, Disp: , Rfl:     aspirin (ASA) 81 MG chewable tablet, , Disp: , Rfl:     blood glucose (NO BRAND SPECIFIED) test strip, Use to test blood sugar  once time, daily., Disp: 100 strip, Rfl: 3    calcium-vitamin D-vitamin K (VIACTIV) 500-500-40 MG-UNT-MCG CHEW, , Disp: , Rfl:     lisinopril-hydrochlorothiazide (ZESTORETIC) 20-12.5 MG tablet, Take 1 tablet by mouth daily, Disp: 90 tablet, Rfl: 3    metFORMIN (GLUCOPHAGE-XR) 750 MG 24 hr tablet, TAKE 2 TABLETS BY MOUTH EVERY MORNING AND 1 TABLET BY MOUTH EVERY EVENING., Disp: 270 tablet, Rfl: 3    cyclobenzaprine (FLEXERIL) 10 MG tablet, Take 1 tablet (10 mg) by mouth 3 times daily as needed for muscle spasms (Patient not taking: Reported on 9/9/2024), Disp: 15 tablet, Rfl: 0  ALLERGIES:    Allergies   Allergen Reactions    Omeprazole Muscle Pain (Myalgia)     Cramps   from  Opmerprazole  ?      SOCIAL HISTORY:   Social History     Socioeconomic History    Marital status:      Spouse name: Not on file    Number of children: Not on file    Years of education: Not on file    Highest education level: Not on file   Occupational History    Not on file   Tobacco Use    Smoking status: Never    Smokeless tobacco: Never   Vaping Use    Vaping status: Never Used   Substance and Sexual Activity    Alcohol use: Not Currently    Drug use: Never    Sexual activity: Not Currently   Other Topics Concern    Not on file   Social History Narrative    Not on file     Social Determinants of Health     Financial Resource Strain: Low Risk  (10/18/2023)    Financial Resource Strain     Within the past 12 months, have you or your family members you live with been unable to get utilities (heat, electricity) when it was really needed?: No   Food Insecurity: Low Risk  (10/18/2023)    Food Insecurity     Within the past 12 months, did you worry that your food would run out before you got money to buy more?: No     Within the past 12 months, did the food you bought just not last and you didn t have money to get more?: No   Transportation Needs: Low Risk  (10/18/2023)    Transportation Needs     Within the past 12 months, has lack of  "transportation kept you from medical appointments, getting your medicines, non-medical meetings or appointments, work, or from getting things that you need?: No   Physical Activity: Not on file   Stress: Not on file   Social Connections: Not on file   Interpersonal Safety: Low Risk  (10/18/2023)    Interpersonal Safety     Do you feel physically and emotionally safe where you currently live?: Yes     Within the past 12 months, have you been hit, slapped, kicked or otherwise physically hurt by someone?: No     Within the past 12 months, have you been humiliated or emotionally abused in other ways by your partner or ex-partner?: No   Housing Stability: Low Risk  (10/18/2023)    Housing Stability     Do you have housing? : Yes     Are you worried about losing your housing?: No     FAMILY HISTORY: History reviewed. No pertinent family history.  EXAM:Vitals: /72 (BP Location: Left arm, Patient Position: Sitting, Cuff Size: Adult Regular)   Ht 1.67 m (5' 5.75\")   Wt 94.3 kg (208 lb)   BMI 33.83 kg/m    BMI= Body mass index is 33.83 kg/m .    General appearance: Patient is alert and fully cooperative with history & exam.  No sign of distress is noted during the visit.     Psychiatric: Affect is pleasant & appropriate.  Patient appears motivated to improve health.     Respiratory: Breathing is regular & unlabored while sitting.     HEENT: Hearing is intact to spoken word.  Speech is clear.  No gross evidence of visual impairment that would impact ambulation.     Vascular: DP 2/4 & PT 2/4 left & right.  CFT immediate.  Temperature warm to warm proximal to distal.  Minor edema and varicosities.  Hair growth is diminished but still present on the foot.  Minimal if any rubor noted.     Neurologic: Normal plantar response bilateral.  Intact protective threshold plus 10/10 applications of a 5.07 monofilament.  Pt admits no burning and paraesthesias about the feet and toes with palpation.     Dermatologic: Left third and " fourth toenails have been removed but there is a small spicule noted.  Left lateral hallux nail and medial left second toenail are incurvated along the borders.  Subtle erythema is noted.  Discomfort is noted with palpation very minimally diminished texture turgor tone about the integument.     Musculoskeletal: Patient is ambulatory without assistive device or brace.  There is semi reducible contracture of the lesser digits.    Hemoglobin A1C (%)   Date Value   10/18/2023 5.8 (H)   02/14/2023 6.0 (H)   08/03/2022 5.9 (H)   03/10/2022 6.0 (H)     Creatinine (mg/dL)   Date Value   02/14/2023 1.40 (H)   03/10/2022 1.32 (H)     ASSESSMENT:       ICD-10-CM    1. Ingrown nail  L60.0       2. Type 2 diabetes mellitus with stage 3b chronic kidney disease, without long-term current use of insulin (H)  E11.22     N18.32           PLAN:    2/15/2023  Minimal debridement today to demonstrate appropriate techniques.  Written instructions for help with nail debridement from the foot care certified nurses dispensed.  If this does not provide adequate relief we may consider permanent matrixectomy and this was discussed with him.  He wishes to attempt further debridement first which is reasonable.  His risks are not significant as he has no loss of protective threshold and has adequate arterial inflow.    We discussed risk factors and preventive measures.    We discussed appropriate hygiene, shoe gear, daily foot exam, and reinforced management of weight, diet, activity goals and HA1C goal for diabetic patients.    Dispensed written foot care instructions.    All questions were answered to their satisfaction.    If the patient calls in the next few days requesting an antibiotic 1 time that seems reasonable Keflex 500 mg 3 times daily x10 days.  Otherwise follow-up for matrixectomy with any continued symptoms.    7/6/2023  Offered matrixectomy of the affected hallux nail borders.  Discussed the postoperative course.  Discussed  appropriate debridement techniques.  Demonstrated these techniques.  If this remains symptomatic would recommend he follow-up for permanent matrixectomy.  Written instructions regarding postoperative phenol matrixectomy dispensed.  We discussed postoperative expectations.  All questions were answered.    9/9/2024  Patient is requesting permanent matrixectomy of the left lateral hallux and left medial second toenail.  We reviewed medical history and EPIC chart.  After obtaining informed consent to permanently remove left lateral hallux and medial left 2nd toenail borders. , I utilized 3 cc of lidocaine plain to achieve local anesthesia per digit.  The toenails were then prepped with Betadine in usual fashion.  A quarter inch Penrose drain was then utilized for hemostasis.  100% of the toenail border was avulsed utilizing a nail elevator, english anvil, 6100 blade and hemostat.  The proximal root portion of the nail was confirmed to be removed atraumatically.  Three applications of 89% phenol were applied to the surgical site for 30 seconds each followed by a curette after each application.  Surgical site was then flushed with alcohol and dressed with bacitracin and a nonadherent compression dressing.  Tourniquet was removed after approximately 3 minutes followed by immediate hyperemia to the distal aspect of the hallux.  Written postoperative instructions were dispensed and patient instructed to follow up in 1-2 weeks with any questions, pain,drainage, delayed healing or concerns.  I answered all questions to patients satisfaction.     If patient calls in the next 1-3 weeks with continued redness, pain or drainage I would recommend beginning oral Keflex 500 mg, 4 times a day ×10 days, after confirming there is no allergy.       J Carlos Rutledge D.P.M.

## 2024-10-24 ENCOUNTER — OFFICE VISIT (OUTPATIENT)
Dept: FAMILY MEDICINE | Facility: CLINIC | Age: 84
End: 2024-10-24
Attending: FAMILY MEDICINE
Payer: COMMERCIAL

## 2024-10-24 VITALS
HEART RATE: 76 BPM | RESPIRATION RATE: 18 BRPM | OXYGEN SATURATION: 97 % | WEIGHT: 211.3 LBS | HEIGHT: 65 IN | BODY MASS INDEX: 35.2 KG/M2 | DIASTOLIC BLOOD PRESSURE: 70 MMHG | SYSTOLIC BLOOD PRESSURE: 138 MMHG | TEMPERATURE: 97.5 F

## 2024-10-24 DIAGNOSIS — N18.32 CKD STAGE 3B, GFR 30-44 ML/MIN (H): ICD-10-CM

## 2024-10-24 DIAGNOSIS — Z00.00 ENCOUNTER FOR MEDICARE ANNUAL WELLNESS EXAM: Primary | ICD-10-CM

## 2024-10-24 DIAGNOSIS — E11.22 TYPE 2 DIABETES MELLITUS WITH STAGE 3B CHRONIC KIDNEY DISEASE, WITHOUT LONG-TERM CURRENT USE OF INSULIN (H): ICD-10-CM

## 2024-10-24 DIAGNOSIS — N18.32 TYPE 2 DIABETES MELLITUS WITH STAGE 3B CHRONIC KIDNEY DISEASE, WITHOUT LONG-TERM CURRENT USE OF INSULIN (H): ICD-10-CM

## 2024-10-24 DIAGNOSIS — I10 BENIGN ESSENTIAL HYPERTENSION: ICD-10-CM

## 2024-10-24 LAB
ANION GAP SERPL CALCULATED.3IONS-SCNC: 17 MMOL/L (ref 7–15)
BUN SERPL-MCNC: 18.4 MG/DL (ref 8–23)
CALCIUM SERPL-MCNC: 9.8 MG/DL (ref 8.8–10.4)
CHLORIDE SERPL-SCNC: 99 MMOL/L (ref 98–107)
CHOLEST SERPL-MCNC: 153 MG/DL
CREAT SERPL-MCNC: 1.7 MG/DL (ref 0.67–1.17)
CREAT UR-MCNC: 71.1 MG/DL
EGFRCR SERPLBLD CKD-EPI 2021: 40 ML/MIN/1.73M2
ERYTHROCYTE [DISTWIDTH] IN BLOOD BY AUTOMATED COUNT: 13.5 % (ref 10–15)
EST. AVERAGE GLUCOSE BLD GHB EST-MCNC: 131 MG/DL
FASTING STATUS PATIENT QL REPORTED: NO
FASTING STATUS PATIENT QL REPORTED: NO
GLUCOSE SERPL-MCNC: 92 MG/DL (ref 70–99)
HBA1C MFR BLD: 6.2 %
HCO3 SERPL-SCNC: 22 MMOL/L (ref 22–29)
HCT VFR BLD AUTO: 45.4 % (ref 40–53)
HDLC SERPL-MCNC: 28 MG/DL
HGB BLD-MCNC: 16 G/DL (ref 13.3–17.7)
LDLC SERPL CALC-MCNC: 68 MG/DL
MCH RBC QN AUTO: 31.7 PG (ref 26.5–33)
MCHC RBC AUTO-ENTMCNC: 35.2 G/DL (ref 31.5–36.5)
MCV RBC AUTO: 90 FL (ref 78–100)
MICROALBUMIN UR-MCNC: 148.4 MG/L
MICROALBUMIN/CREAT UR: 208.72 MG/G CR (ref 0–17)
NONHDLC SERPL-MCNC: 125 MG/DL
PLATELET # BLD AUTO: 182 10E3/UL (ref 150–450)
POTASSIUM SERPL-SCNC: 4.1 MMOL/L (ref 3.4–5.3)
RBC # BLD AUTO: 5.05 10E6/UL (ref 4.4–5.9)
SODIUM SERPL-SCNC: 138 MMOL/L (ref 135–145)
TRIGL SERPL-MCNC: 286 MG/DL
WBC # BLD AUTO: 7.5 10E3/UL (ref 4–11)

## 2024-10-24 PROCEDURE — 80061 LIPID PANEL: CPT | Performed by: FAMILY MEDICINE

## 2024-10-24 PROCEDURE — 82043 UR ALBUMIN QUANTITATIVE: CPT | Performed by: FAMILY MEDICINE

## 2024-10-24 PROCEDURE — 80048 BASIC METABOLIC PNL TOTAL CA: CPT | Performed by: FAMILY MEDICINE

## 2024-10-24 PROCEDURE — 82570 ASSAY OF URINE CREATININE: CPT | Performed by: FAMILY MEDICINE

## 2024-10-24 PROCEDURE — 99214 OFFICE O/P EST MOD 30 MIN: CPT | Mod: 25 | Performed by: FAMILY MEDICINE

## 2024-10-24 PROCEDURE — G0439 PPPS, SUBSEQ VISIT: HCPCS | Performed by: FAMILY MEDICINE

## 2024-10-24 PROCEDURE — 83036 HEMOGLOBIN GLYCOSYLATED A1C: CPT | Performed by: FAMILY MEDICINE

## 2024-10-24 PROCEDURE — 85027 COMPLETE CBC AUTOMATED: CPT | Performed by: FAMILY MEDICINE

## 2024-10-24 PROCEDURE — 36415 COLL VENOUS BLD VENIPUNCTURE: CPT | Performed by: FAMILY MEDICINE

## 2024-10-24 RX ORDER — METFORMIN HYDROCHLORIDE 750 MG/1
TABLET, EXTENDED RELEASE ORAL
Qty: 270 TABLET | Refills: 4 | Status: SHIPPED | OUTPATIENT
Start: 2024-10-24

## 2024-10-24 RX ORDER — LISINOPRIL AND HYDROCHLOROTHIAZIDE 12.5; 2 MG/1; MG/1
1 TABLET ORAL DAILY
Qty: 90 TABLET | Refills: 4 | Status: SHIPPED | OUTPATIENT
Start: 2024-10-24

## 2024-10-24 SDOH — HEALTH STABILITY: PHYSICAL HEALTH: ON AVERAGE, HOW MANY DAYS PER WEEK DO YOU ENGAGE IN MODERATE TO STRENUOUS EXERCISE (LIKE A BRISK WALK)?: 0 DAYS

## 2024-10-24 SDOH — HEALTH STABILITY: PHYSICAL HEALTH: ON AVERAGE, HOW MANY MINUTES DO YOU ENGAGE IN EXERCISE AT THIS LEVEL?: 0 MIN

## 2024-10-24 ASSESSMENT — PAIN SCALES - GENERAL: PAINLEVEL_OUTOF10: MILD PAIN (3)

## 2024-10-24 ASSESSMENT — SOCIAL DETERMINANTS OF HEALTH (SDOH): HOW OFTEN DO YOU GET TOGETHER WITH FRIENDS OR RELATIVES?: TWICE A WEEK

## 2024-10-24 NOTE — PROGRESS NOTES
Preventive Care Visit  [facility]  Dk Ortiz MD, Family Medicine  Oct 24, 2024      Assessment & Plan     Encounter for Medicare annual wellness exam  Jose Rafael Gama is a an 84-year-old male who presents to clinic today for his Medicare annual wellness visit.  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  He has a history of hypertension.    He returns to clinic today feeling that his health is in general good.  He continues to work intermittently as a  which requires quite a bit of time driving.  He still has some occasional low back pain and right hip pain when he sits too long in the car.    All age and gender specific screening recommendations were reviewed.  He is currently up-to-date with all recommended screenings.  His last colonoscopy was in 2015 through [facility] and was normal.    All recommended vaccines and schedules were reviewed.  He is compliant and current with all vaccinations including this years influenza and COVID vaccination.  We discussed risks and benefits of RSV vaccination.  Given his age and relative health he could benefit from the RSV vaccination.  He will obtain this through his local pharmacy.    On exam he has no well-developed well-nourished elderly male in no acute distress.  Blood pressure is 138/70.  Pulse is 76 and regular.  He is afebrile.  Respiratory rate is 18 with oxygen saturations of 97% on room air.  His weight is stable with a body mass index of 34.8.    HEENT exam pupils are equally round and reactive to light and accommodation, extraocular muscles are intact.  He does have a follow-up appointment with [facility] for his annual diabetic exam in 2 weeks.    TMs are clear bilaterally.  Oropharynx is clear.  Tongue projects midline palate raises symmetrically.  Neck is supple without adenopathy, thyromegaly or carotid bruits.   Lungs are clear to auscultation without wheezing rales or rhonchi.  Cardiac exam is regular without murmur rub or gallop.  Abdomen is soft, nondistended and nontender.  Bowel sounds present.  No appreciable organomegaly.  Extremities no clubbing cyanosis or edema.  Neurologic exam is nonfocal.  He is alert and oriented x 3.  Cranial nerves II through XII are fully intact without deficit.  Deep tendon reflexes are symmetric in the upper and lower extremities.  He has excellent  strength and is stable on standing.  Mood and affect are good.    Laboratories:  Results for orders placed or performed in visit on 10/24/24   Albumin Random Urine Quantitative with Creat Ratio     Status: Abnormal   Result Value Ref Range    Creatinine Urine mg/dL 71.1 mg/dL    Albumin Urine mg/L 148.4 mg/L    Albumin Urine mg/g Cr 208.72 (H) 0.00 - 17.00 mg/g Cr   BASIC METABOLIC PANEL     Status: Abnormal   Result Value Ref Range    Sodium 138 135 - 145 mmol/L    Potassium 4.1 3.4 - 5.3 mmol/L    Chloride 99 98 - 107 mmol/L    Carbon Dioxide (CO2) 22 22 - 29 mmol/L    Anion Gap 17 (H) 7 - 15 mmol/L    Urea Nitrogen 18.4 8.0 - 23.0 mg/dL    Creatinine 1.70 (H) 0.67 - 1.17 mg/dL    GFR Estimate 40 (L) >60 mL/min/1.73m2    Calcium 9.8 8.8 - 10.4 mg/dL    Glucose 92 70 - 99 mg/dL    Patient Fasting > 8hrs? No    HEMOGLOBIN A1C     Status: Abnormal   Result Value Ref Range    Estimated Average Glucose 131 (H) <117 mg/dL    Hemoglobin A1C 6.2 (H) <5.7 %   Lipid panel reflex to direct LDL Non-fasting     Status: Abnormal   Result Value Ref Range    Cholesterol 153 <200 mg/dL    Triglycerides 286 (H) <150 mg/dL    Direct Measure HDL 28 (L) >=40 mg/dL    LDL Cholesterol Calculated 68 <100 mg/dL    Non HDL Cholesterol 125 <130 mg/dL    Patient Fasting > 8hrs? No     Narrative    Cholesterol  Desirable: < 200 mg/dL  Borderline High: 200 - 239 mg/dL  High: >= 240 mg/dL    Triglycerides  Normal: < 150 mg/dL  Borderline High: 150 - 199 mg/dL  High:  200-499 mg/dL  Very High: >= 500 mg/dL    Direct Measure HDL  Female: >= 50 mg/dL   Male: >= 40 mg/dL    LDL Cholesterol  Desirable: < 100 mg/dL  Above Desirable: 100 - 129 mg/dL   Borderline High: 130 - 159 mg/dL   High:  160 - 189 mg/dL   Very High: >= 190 mg/dL    Non HDL Cholesterol  Desirable: < 130 mg/dL  Above Desirable: 130 - 159 mg/dL  Borderline High: 160 - 189 mg/dL  High: 190 - 219 mg/dL  Very High: >= 220 mg/dL   CBC with platelets     Status: Normal   Result Value Ref Range    WBC Count 7.5 4.0 - 11.0 10e3/uL    RBC Count 5.05 4.40 - 5.90 10e6/uL    Hemoglobin 16.0 13.3 - 17.7 g/dL    Hematocrit 45.4 40.0 - 53.0 %    MCV 90 78 - 100 fL    MCH 31.7 26.5 - 33.0 pg    MCHC 35.2 31.5 - 36.5 g/dL    RDW 13.5 10.0 - 15.0 %    Platelet Count 182 150 - 450 10e3/uL     Hemoglobin A1c remains well-controlled at 6.2.  Hemoglobin is stable at 16 and white count and platelet counts are appropriate.  Lipid profile remains well-controlled with an LDL cholesterol of 68.  Renal profile is stable with a slight rise in the creatinine to 1.7 and a GFR of 40.  Blood glucose is 92.  Encouraged ongoing regular exercise.  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  He does own some property and does a lot of routine maintenance and yard work.  He is active every day which I encouraged.  Recommend slight reduction in the total calories daily.  Encourage plenty of fluids.  Continue regular medication.  Recommend follow-up visit to establish primary care with one of my partners in the next 6 months and follow-up with an annual wellness visit in 1 year.    Assessment: 84-year-old male who presents to clinic for his Medicare annual wellness visit.  He is current with all recommended screenings and vaccinations other than his RSV.  He will consider RSV vaccination and obtain this through his local pharmacy.  His blood pressure is well-controlled today and  his diabetes continues to be under good control on metformin.  His renal function remains stable with a creatinine slightly up to 1.7.  And he does have some ongoing proteinuria.  Continue lisinopril hydrochlorothiazide.  Would increase the frequency of monitoring of renal function to every 6 months.    Type 2 diabetes mellitus with stage 3b chronic kidney disease, without long-term current use of insulin (H)  Chronic, stable.  He is currently on metformin .  He takes 2 tablets in the morning and 1 in the evening.  No GI side effects.  He is on lisinopril hydrochlorothiazide 20-12.5 once daily.  Blood pressure is well-controlled.  Continue current medication and plan.  Continue checking A1c's in 6 months and recheck renal function in 6 months.  - BASIC METABOLIC PANEL; Future  - HEMOGLOBIN A1C; Future  - Albumin Random Urine Quantitative with Creat Ratio; Future  - Lipid panel reflex to direct LDL Non-fasting; Future  - CBC with platelets; Future  - lisinopril-hydrochlorothiazide (ZESTORETIC) 20-12.5 MG tablet; Take 1 tablet by mouth daily.  - metFORMIN (GLUCOPHAGE-XR) 750 MG 24 hr tablet; TAKE 2 TABLETS BY MOUTH EVERY MORNING AND 1 TABLET BY MOUTH EVERY EVENING.  - Albumin Random Urine Quantitative with Creat Ratio  - BASIC METABOLIC PANEL  - HEMOGLOBIN A1C  - Lipid panel reflex to direct LDL Non-fasting  - CBC with platelets    CKD stage 3b, GFR 30-44 ml/min (H)  Chronic, stable.  Continue current medication and plan.  Would increase frequency of monitoring of renal function to every 6 months.    Benign essential hypertension  Chronic, stable.  Blood pressure is well-controlled.  Does not check blood pressure at home regularly.  Renal profile remains stable at CKD 3B with slight proteinuria.  Continue lisinopril hydrochlorothiazide and continue monitoring blood pressures sporadically.  - lisinopril-hydrochlorothiazide (ZESTORETIC) 20-12.5 MG tablet; Take 1 tablet by mouth daily.    Patient has been advised  "of split billing requirements and indicates understanding: Yes        BMI  Estimated body mass index is 34.89 kg/m  as calculated from the following:    Height as of this encounter: 1.657 m (5' 5.25\").    Weight as of this encounter: 95.8 kg (211 lb 4.8 oz).   Weight management plan: Discussed healthy diet and exercise guidelines    Counseling  Appropriate preventive services were addressed with this patient via screening, questionnaire, or discussion as appropriate for fall prevention, nutrition, physical activity, Tobacco-use cessation, social engagement, weight loss and cognition.  Checklist reviewing preventive services available has been given to the patient.  Reviewed patient's diet, addressing concerns and/or questions.   He is at risk for psychosocial distress and has been provided with information to reduce risk.       FUTURE LABS:       - Schedule non-fasting labs in 6 months  FUTURE APPOINTMENTS:       - Follow-up visit in 6 months with one of my partners to establish primary care as I will be retiring.  SELF MONITORING:       - Please check blood pressure readings several times per week  Work on weight loss  Regular exercise   Follow-up Visit   Expected date:  Oct 31, 2025 (Approximate)      Follow Up Appointment Details:     Follow-up with whom?: PCP    Follow-Up for what?: Medicare Wellness    Welcome or Annual?: Annual Wellness    How?: In Person                   Logan Mantilla is a 84 year old, presenting for the following:  Wellness Visit        10/24/2024     4:21 PM   Additional Questions   Roomed by Naz SCHMID         10/24/2024     4:21 PM   Patient Reported Additional Medications   Patient reports taking the following new medications over the counter nose spray           HPI      Diabetes Follow-up    How often are you checking your blood sugar? Not at all  What concerns do you have today about your diabetes? None   Do you have any of these symptoms? (Select all that apply)  No numbness or " tingling in feet.  No redness, sores or blisters on feet.  No complaints of excessive thirst.  No reports of blurry vision.  No significant changes to weight.  Have you had a diabetic eye exam in the last 12 months? No appointment scheduled with [facility] in 2 weeks.        BP Readings from Last 2 Encounters:   10/24/24 138/70   09/09/24 130/72     Hemoglobin A1C (%)   Date Value   10/24/2024 6.2 (H)   10/18/2023 5.8 (H)     LDL Cholesterol Calculated (mg/dL)   Date Value   10/24/2024 68   10/18/2023 59             Hypertension Follow-up    Do you check your blood pressure regularly outside of the clinic? No   Are you following a low salt diet? No  Are your blood pressures ever more than 140 on the top number (systolic) OR more   than 90 on the bottom number (diastolic), for example 140/90? No    Chronic Kidney Disease Follow-up    Do you take any over the counter pain medicine?: Yes  What over the counter medicine are you taking for your pain?:  Tylenol rarely   How often do you take this medicine?:  A few times a week    Health Care Directive  Patient does not have a Health Care Directive: Discussed advance care planning with patient; however, patient declined at this time.      10/24/2024   General Health   How would you rate your overall physical health? (!) FAIR   Feel stress (tense, anxious, or unable to sleep) Only a little      (!) STRESS CONCERN      10/24/2024   Nutrition   Diet: Regular (no restrictions)            10/24/2024   Exercise   Days per week of moderate/strenous exercise 0 days   Average minutes spent exercising at this level 0 min      (!) EXERCISE CONCERN      10/24/2024   Social Factors   Frequency of gathering with friends or relatives Twice a week   Worry food won't last until get money to buy more No   Food not last or not have enough money for food? No   Do you have housing? (Housing is defined as stable permanent housing and does not include staying ouside in a car, in a  tent, in an abandoned building, in an overnight shelter, or couch-surfing.) Yes   Are you worried about losing your housing? No   Lack of transportation? No   Unable to get utilities (heat,electricity)? No            10/24/2024   Fall Risk   Fallen 2 or more times in the past year? No     No    Trouble with walking or balance? No     No        Patient-reported    Multiple values from one day are sorted in reverse-chronological order          10/24/2024   Activities of Daily Living- Home Safety   Needs help with the following daily activites None of the above   Safety concerns in the home None of the above            10/24/2024   Dental   Dentist two times every year? Yes            10/24/2024   Hearing Screening   Hearing concerns? None of the above            10/24/2024   Driving Risk Screening   Patient/family members have concerns about driving No            10/24/2024   General Alertness/Fatigue Screening   Have you been more tired than usual lately? No            10/24/2024   Urinary Incontinence Screening   Bothered by leaking urine in past 6 months No            10/24/2024   TB Screening   Were you born outside of the [place]? Yes            Today's PHQ-2 Score:       10/24/2024    10:26 AM   PHQ-2 ( 1999 Pfizer)   Q1: Little interest or pleasure in doing things 0    Q2: Feeling down, depressed or hopeless 0    PHQ-2 Score 0    Q1: Little interest or pleasure in doing things Not at all   Q2: Feeling down, depressed or hopeless Not at all   PHQ-2 Score 0       Patient-reported           10/24/2024   Substance Use   Alcohol more than 3/day or more than 7/wk No   Do you have a current opioid prescription? No   How severe/bad is pain from 1 to 10? 3/10   Do you use any other substances recreationally? (!) OTHER        Social History     Tobacco Use    Smoking status: Never    Smokeless tobacco: Never   Vaping Use    Vaping status: Never Used   Substance Use Topics    Alcohol use: Never    Drug use: Never                  Reviewed and updated as needed this visit by Provider                    Lab work is in process  Labs reviewed in EPIC  BP Readings from Last 3 Encounters:   10/24/24 (!) 150/80   09/09/24 130/72   05/26/24 (!) 171/87    Wt Readings from Last 3 Encounters:   10/24/24 95.8 kg (211 lb 4.8 oz)   09/09/24 94.3 kg (208 lb)   05/26/24 96.2 kg (212 lb)                  Patient Active Problem List   Diagnosis    Type 2 diabetes mellitus with stage 3b chronic kidney disease, without long-term current use of insulin (H)    Acute right-sided low back pain without sciatica    Hip pain, right     Past Surgical History:   Procedure Laterality Date    COLONOSCOPY  approx 10 years ago    HERNIA REPAIR  approx 35 years ago       Social History     Tobacco Use    Smoking status: Never    Smokeless tobacco: Never   Substance Use Topics    Alcohol use: Never     Family History   Problem Relation Age of Onset    Cerebrovascular Disease Mother     Diabetes Father          Current Outpatient Medications   Medication Sig Dispense Refill    aspirin (ASA) 81 MG chewable tablet       blood glucose (NO BRAND SPECIFIED) test strip Use to test blood sugar once time, daily. 100 strip 3    calcium-vitamin D-vitamin K (VIACTIV) 500-500-40 MG-UNT-MCG CHEW       lisinopril-hydrochlorothiazide (ZESTORETIC) 20-12.5 MG tablet Take 1 tablet by mouth daily 90 tablet 3    metFORMIN (GLUCOPHAGE-XR) 750 MG 24 hr tablet TAKE 2 TABLETS BY MOUTH EVERY MORNING AND 1 TABLET BY MOUTH EVERY EVENING. 270 tablet 3    acetaminophen (TYLENOL) 500 MG tablet Take 1,000 mg by mouth every 6 hours as needed for mild pain (Patient not taking: Reported on 10/24/2024)      cyclobenzaprine (FLEXERIL) 10 MG tablet Take 1 tablet (10 mg) by mouth 3 times daily as needed for muscle spasms (Patient not taking: Reported on 10/24/2024) 15 tablet 0     Allergies   Allergen Reactions    Omeprazole Muscle Pain (Myalgia)     Cramps   from  Opmerprazole  ?      Recent Labs    Lab Test 10/18/23  1706 02/14/23  0814 08/03/22  1715 03/10/22  0900   A1C 5.8* 6.0* 5.9* 6.0*   LDL 59  --  46  --    HDL 33*  --  27*  --    TRIG 274*  --  306*  --    ALT  --   --   --  29   CR  --  1.40*  --  1.32*   GFRESTIMATED  --  51*  --  55*   POTASSIUM  --  4.4  --  4.1   TSH  --   --   --  4.71*      Current providers sharing in care for this patient include:  Patient Care Team:  Dk Ortiz MD as PCP - General (Family Medicine)  Dk Ortiz MD as Assigned PCP  Rui Flores MD as MD (Dermatology)  J Carlos Rutledge DPM as Assigned Surgical Provider    The following health maintenance items are reviewed in Epic and correct as of today:  Health Maintenance   Topic Date Due    RSV VACCINE (1 - 1-dose 75+ series) Never done    BMP  02/14/2024    A1C  04/18/2024    MICROALBUMIN  04/18/2024    LIPID  10/18/2024    DIABETIC FOOT EXAM  10/18/2024    ANNUAL REVIEW OF HM ORDERS  10/18/2024    MEDICARE ANNUAL WELLNESS VISIT  10/18/2024    HEMOGLOBIN  10/18/2024    EYE EXAM  11/17/2024    FALL RISK ASSESSMENT  10/24/2025    ADVANCE CARE PLANNING  10/18/2028    DTAP/TDAP/TD IMMUNIZATION (2 - Td or Tdap) 11/16/2033    PARATHYROID  Completed    PHOSPHORUS  Completed    PHQ-2 (once per calendar year)  Completed    INFLUENZA VACCINE  Completed    Pneumococcal Vaccine: 65+ Years  Completed    URINALYSIS  Completed    ALK PHOS  Completed    ZOSTER IMMUNIZATION  Completed    COVID-19 Vaccine  Completed    HPV IMMUNIZATION  Aged Out    MENINGITIS IMMUNIZATION  Aged Out    RSV MONOCLONAL ANTIBODY  Aged Out         Review of Systems  CONSTITUTIONAL: NEGATIVE for fever, chills, change in weight  ENT/MOUTH: NEGATIVE for ear, mouth and throat problems  RESP: NEGATIVE for significant cough or SOB  CV: NEGATIVE for chest pain, palpitations or peripheral edema  ROS otherwise negative     Objective    Exam  /70 (BP Location: Right arm, Patient Position: Sitting, Cuff Size: Adult Large)   Pulse 76    "Temp 97.5  F (36.4  C) (Temporal)   Resp 18   Ht 1.657 m (5' 5.25\")   Wt 95.8 kg (211 lb 4.8 oz)   SpO2 97%   BMI 34.89 kg/m     Estimated body mass index is 34.89 kg/m  as calculated from the following:    Height as of this encounter: 1.657 m (5' 5.25\").    Weight as of this encounter: 95.8 kg (211 lb 4.8 oz).    Physical Exam  GENERAL: alert and no distress  EYES: Eyes grossly normal to inspection, PERRL and conjunctivae and sclerae normal  HENT: ear canals and TM's normal, nose and mouth without ulcers or lesions  NECK: no adenopathy, no asymmetry, masses, or scars  RESP: lungs clear to auscultation - no rales, rhonchi or wheezes  CV: regular rate and rhythm, normal S1 S2, no S3 or S4, no murmur, click or rub, no peripheral edema  ABDOMEN: soft, nontender, no hepatosplenomegaly, no masses and bowel sounds normal  MS: no gross musculoskeletal defects noted, no edema  NEURO: Normal strength and tone, mentation intact and speech normal  PSYCH: mentation appears normal, affect normal/bright  LYMPH: no cervical, supraclavicular, axillary, or inguinal adenopathy        10/24/2024   Mini Cog   Clock Draw Score 0 Abnormal   3 Item Recall 2 objects recalled   Mini Cog Total Score 2                 Signed Electronically by: Dk Ortiz MD    "

## 2024-10-24 NOTE — PATIENT INSTRUCTIONS
Patient Education   Preventive Care Advice   This is general advice given by our system to help you stay healthy. However, your care team may have specific advice just for you. Please talk to your care team about your preventive care needs.  Nutrition  Eat 5 or more servings of fruits and vegetables each day.  Try wheat bread, brown rice and whole grain pasta (instead of white bread, rice, and pasta).  Get enough calcium and vitamin D. Check the label on foods and aim for 100% of the RDA (recommended daily allowance).  Lifestyle  Exercise at least 150 minutes each week  (30 minutes a day, 5 days a week).  Do muscle strengthening activities 2 days a week. These help control your weight and prevent disease.  No smoking.  Wear sunscreen to prevent skin cancer.  Have a dental exam and cleaning every 6 months.  Yearly exams  See your health care team every year to talk about:  Any changes in your health.  Any medicines your care team has prescribed.  Preventive care, family planning, and ways to prevent chronic diseases.  Shots (vaccines)   HPV shots (up to age 26), if you've never had them before.  Hepatitis B shots (up to age 59), if you've never had them before.  COVID-19 shot: Get this shot when it's due.  Flu shot: Get a flu shot every year.  Tetanus shot: Get a tetanus shot every 10 years.  Pneumococcal, hepatitis A, and RSV shots: Ask your care team if you need these based on your risk.  Shingles shot (for age 50 and up)  General health tests  Diabetes screening:  Starting at age 35, Get screened for diabetes at least every 3 years.  If you are younger than age 35, ask your care team if you should be screened for diabetes.  Cholesterol test: At age 39, start having a cholesterol test every 5 years, or more often if advised.  Bone density scan (DEXA): At age 50, ask your care team if you should have this scan for osteoporosis (brittle bones).  Hepatitis C: Get tested at least once in your life.  STIs (sexually  transmitted infections)  Before age 24: Ask your care team if you should be screened for STIs.  After age 24: Get screened for STIs if you're at risk. You are at risk for STIs (including HIV) if:  You are sexually active with more than one person.  You don't use condoms every time.  You or a partner was diagnosed with a sexually transmitted infection.  If you are at risk for HIV, ask about PrEP medicine to prevent HIV.  Get tested for HIV at least once in your life, whether you are at risk for HIV or not.  Cancer screening tests  Cervical cancer screening: If you have a cervix, begin getting regular cervical cancer screening tests starting at age 21.  Breast cancer scan (mammogram): If you've ever had breasts, begin having regular mammograms starting at age 40. This is a scan to check for breast cancer.  Colon cancer screening: It is important to start screening for colon cancer at age 45.  Have a colonoscopy test every 10 years (or more often if you're at risk) Or, ask your provider about stool tests like a FIT test every year or Cologuard test every 3 years.  To learn more about your testing options, visit:   .  For help making a decision, visit:   https://bit.ly/np97302.  Prostate cancer screening test: If you have a prostate, ask your care team if a prostate cancer screening test (PSA) at age 55 is right for you.  Lung cancer screening: If you are a current or former smoker ages 50 to 80, ask your care team if ongoing lung cancer screenings are right for you.  For informational purposes only. Not to replace the advice of your health care provider. Copyright   2023 [facility]. All rights reserved. Clinically reviewed by the [facility]. [employer] 066010 - REV 01/24.  Learning About Being Active as an Older Adult  Why is being active important as you get older?     Being active is one of the best things you can do for your health. And it's never too late to start. Being  active--or getting active, if you aren't already--has definite benefits. It can:  Give you more energy,  Keep your mind sharp.  Improve balance to reduce your risk of falls.  Help you manage chronic illness with fewer medicines.  No matter how old you are, how fit you are, or what health problems you have, there is a form of activity that will work for you. And the more physical activity you can do, the better your overall health will be.  What kinds of activity can help you stay healthy?  Being more active will make your daily activities easier. Physical activity includes planned exercise and things you do in daily life. There are four types of activity:  Aerobic.  Doing aerobic activity makes your heart and lungs strong.  Includes walking, dancing, and gardening.  Aim for at least 2  hours spread throughout the week.  It improves your energy and can help you sleep better.  Muscle-strengthening.  This type of activity can help maintain muscle and strengthen bones.  Includes climbing stairs, using resistance bands, and lifting or carrying heavy loads.  Aim for at least twice a week.  It can help protect the knees and other joints.  Stretching.  Stretching gives you better range of motion in joints and muscles.  Includes upper arm stretches, calf stretches, and gentle yoga.  Aim for at least twice a week, preferably after your muscles are warmed up from other activities.  It can help you function better in daily life.  Balancing.  This helps you stay coordinated and have good posture.  Includes heel-to-toe walking, philipp chi, and certain types of yoga.  Aim for at least 3 days a week.  It can reduce your risk of falling.  Even if you have a hard time meeting the recommendations, it's better to be more active than less active. All activity done in each category counts toward your weekly total. You'd be surprised how daily things like carrying groceries, keeping up with grandchildren, and taking the stairs can add  "up.  What keeps you from being active?  If you've had a hard time being more active, you're not alone. Maybe you remember being able to do more. Or maybe you've never thought of yourself as being active. It's frustrating when you can't do the things you want. Being more active can help. What's holding you back?  Getting started.  Have a goal, but break it into easy tasks. Small steps build into big accomplishments.  Staying motivated.  If you feel like skipping your activity, remember your goal. Maybe you want to move better and stay independent. Every activity gets you one step closer.  Not feeling your best.  Start with 5 minutes of an activity you enjoy. Prove to yourself you can do it. As you get comfortable, increase your time.  You may not be where you want to be. But you're in the process of getting there. Everyone starts somewhere.  How can you find safe ways to stay active?  Talk with your doctor about any physical challenges you're facing. Make a plan with your doctor if you have a health problem or aren't sure how to get started with activity.  If you're already active, ask your doctor if there is anything you should change to stay safe as your body and health change.  If you tend to feel dizzy after you take medicine, avoid activity at that time. Try being active before you take your medicine. This will reduce your risk of falls.  If you plan to be active at home, make sure to clear your space before you get started. Remove things like TV cords, coffee tables, and throw rugs. It's safest to have plenty of space to move freely.  The key to getting more active is to take it slow and steady. Try to improve only a little bit at a time. Pick just one area to improve on at first. And if an activity hurts, stop and talk to your doctor.  Where can you learn more?  Go to https://www.healthwise.net/patiented  Enter P600 in the search box to learn more about \"Learning About Being Active as an Older Adult.\"  Current " as of: June 5, 2023  Content Version: 14.2 2024 [facility] [employer].   Care instructions adapted under license by your healthcare professional. If you have questions about a medical condition or this instruction, always ask your healthcare professional. Healthwise, Incorporated disclaims any warranty or liability for your use of this information.    Nutrition for Older Adults: Care Instructions  Overview     Good nutrition is important at any age. But it is especially important for older adults. Eating healthy foods helps keep your body strong. And it can help lower your risk for disease.  As you get older, your body needs more of certain nutrients. These include vitamin B12, calcium, and vitamin D. But it may be harder for you to get these and other important nutrients. This could be for many reasons. You may not feel as hungry as you used to. Or you could have problems with your teeth or mouth that make it hard to chew. Or you may not enjoy planning and preparing meals, especially if you live alone.  Talk with your doctor if you want help getting the most nutrition from what you eat. They may have you work with a dietitian to help you plan meals.  Follow-up care is a key part of your treatment and safety. Be sure to make and go to all appointments, and call your doctor if you are having problems. It's also a good idea to know your test results and keep a list of the medicines you take.  How can you care for yourself at home?  To stay healthy  Eat a variety of foods. The more you vary the foods you eat, the more vitamins, minerals, and other nutrients you get.  Ask your doctor if you should take a multivitamin. Choose one with about 100% of the daily value (DV) for vitamins and minerals. Do not take more than 100% of the daily value for any vitamin or mineral unless your doctor tells you to. Talk with your doctor if you are not sure which multivitamin is right for you.  Try to eat lots of fruits and  vegetables. Fresh or frozen vegetables and fruits are healthy choices. Choose canned vegetables that have no salt added and fruits that are canned in their own juice or light syrup.  Include foods that are high in vitamin B12 in your diet. Good choices are fortified breakfast cereal, nonfat or low-fat milk and other dairy products, meat, poultry, fish, and eggs.  Get enough calcium and vitamin D. Good choices include nonfat or low-fat milk, cheese, and yogurt. Other good options are tofu, orange juice with added calcium, and some leafy green vegetables, such as quentin greens and kale. If you don't use milk products, talk to your doctor about calcium and vitamin D supplements.  Try to eat protein foods every day. Good choices include lean meat, fish, poultry, eggs, and cheese. Other good options are cooked beans, peanut butter, and nuts and seeds.  Choose whole grains for half of the grains you eat. Look for 100% whole wheat bread, whole-grain cereals, brown rice, and other whole grains.  If you have constipation  Eat high-fiber foods every day if you can. These include fruits, vegetables, cooked dried beans, and whole grains.  Drink plenty of fluids. If you have kidney, heart, or liver disease and have to limit fluids, talk with your doctor before you increase the amount of fluids you drink.  Ask your doctor if stool softeners may help keep your bowels regular.  If you have mouth problems that make chewing hard  Pick canned or cooked fruits and vegetables. These are often softer.  Chop or shred meat, poultry, and fish. Add sauce or gravy to the meat to help keep it moist.  Pick other protein foods that are soft. These include cheese, peanut butter, cooked beans, cottage cheese, and eggs.  If you have trouble shopping for yourself  Ask a local food store to deliver groceries to your home.  Contact your local area agency on aging and ask about resources that can help.  Ask a family member or neighbor to help you.  If  "you have trouble preparing meals  Try easier cooking methods such as using a slow cooker or microwave oven.  Let the grocery store do some of the work for you. Look for precut, washed, and ready-to-eat foods.  Take part in group meal programs. You can find these through senior citizen programs.  Have meals brought to your home. Your community may offer programs that deliver meals, such as Meals on Wheels. Or you could use an online meal delivery service.  If you are able, take a cooking class.  If your appetite is poor  Try to eat meals on a regular schedule. It may help to eat smaller meals more often throughout the day.  If you can, eat some meals with other people. You could ask family or friends to eat with you. Or you could take part in group meal programs offered in your community.  Ask your doctor if your medicines could cause appetite or taste problems. If so, ask about changing medicines.  Add spices and herbs to increase the flavor of food.  If you think you are depressed, ask your doctor for help. Depression can affect your appetite. And it can make it hard to do everyday activities like grocery shopping and making meals. Treatment can help.  When should you call for help?  Watch closely for changes in your health, and be sure to contact your doctor if you have any problems.  Where can you learn more?  Go to https://www.[employer]net/patiented  Enter L643 in the search box to learn more about \"Nutrition for Older Adults: Care Instructions.\"  Current as of: September 25, 2023  Content Version: 14.2 2024 [facility] [employer].   Care instructions adapted under license by your healthcare professional. If you have questions about a medical condition or this instruction, always ask your healthcare professional. Healthwise, Incorporated disclaims any warranty or liability for your use of this information.    Substance Use Disorder: Care Instructions  Overview     You can improve your life and health by " stopping your use of alcohol or drugs. When you don't drink or use drugs, you may feel and sleep better. You may get along better with your family, friends, and coworkers. There are medicines and programs that can help with substance use disorder.  How can you care for yourself at home?  Here are some ways to help you stay sober and prevent relapse.  If you have been given medicine to help keep you sober or reduce your cravings, be sure to take it exactly as prescribed.  Talk to your doctor about programs that can help you stop using drugs or drinking alcohol.  Do not keep alcohol or drugs in your home.  Plan ahead. Think about what you'll say if other people ask you to drink or use drugs. Try not to spend time with people who drink or use drugs.  Use the time and money spent on drinking or drugs to do something that's important to you.  Preventing a relapse  Have a plan to deal with relapse. Learn to recognize changes in your thinking that lead you to drink or use drugs. Get help before you start to drink or use drugs again.  Try to stay away from situations, friends, or places that may lead you to drink or use drugs.  If you feel the need to drink alcohol or use drugs again, seek help right away. Call a trusted friend or family member. Some people get support from organizations such as Narcotics Anonymous or [facility] or from treatment facilities.  If you relapse, get help as soon as you can. Some people make a plan with another person that outlines what they want that person to do for them if they relapse. The plan usually includes how to handle the relapse and who to notify in case of relapse.  Don't give up. Remember that a relapse doesn't mean that you have failed. Use the experience to learn the triggers that lead you to drink or use drugs. Then quit again. Recovery is a lifelong process. Many people have several relapses before they are able to quit for good.  Follow-up care is a key part of your  "treatment and safety. Be sure to make and go to all appointments, and call your doctor if you are having problems. It's also a good idea to know your test results and keep a list of the medicines you take.  When should you call for help?   Call 911  anytime you think you may need emergency care. For example, call if you or someone else:    Has overdosed or has withdrawal signs. Be sure to tell the emergency workers that you are or someone else is using or trying to quit using drugs. Overdose or withdrawal signs may include:  Losing consciousness.  Seizure.  Seeing or hearing things that aren't there (hallucinations).     Is thinking or talking about suicide or harming others.   Where to get help 24 hours a day, 7 days a week   If you or someone you know talks about suicide, self-harm, a mental health crisis, a substance use crisis, or any other kind of emotional distress, get help right away. You can:    Call the Suicide and Crisis Lifeline at 115.     Call 8-312-219-TALK (1-407.100.6321).     Text HOME to 523011 to access the Crisis Text Line.   Consider saving these numbers in your phone.  Go to [employer] for more information or to chat online.  Call your doctor now or seek immediate medical care if:    You are having withdrawal symptoms. These may include nausea or vomiting, sweating, shakiness, and anxiety.   Watch closely for changes in your health, and be sure to contact your doctor if:    You have a relapse.     You need more help or support to stop.   Where can you learn more?  Go to https://www.[employer].net/patiented  Enter H573 in the search box to learn more about \"Substance Use Disorder: Care Instructions.\"  Current as of: November 15, 2023  Content Version: 14.2 2024 [employer].   Care instructions adapted under license by your healthcare professional. If you have questions about a medical condition or this instruction, always ask your healthcare professional. [employer] Inc[employer] disclaims any warranty or liability for your use of this information.

## 2024-10-25 PROBLEM — N18.32 CKD STAGE 3B, GFR 30-44 ML/MIN (H): Status: ACTIVE | Noted: 2024-10-25

## 2024-10-25 PROBLEM — I10 BENIGN ESSENTIAL HYPERTENSION: Status: ACTIVE | Noted: 2024-10-25

## 2024-12-09 ENCOUNTER — OFFICE VISIT (OUTPATIENT)
Dept: FAMILY MEDICINE | Facility: CLINIC | Age: 84
End: 2024-12-09
Payer: COMMERCIAL

## 2024-12-09 ENCOUNTER — NURSE TRIAGE (OUTPATIENT)
Dept: FAMILY MEDICINE | Facility: CLINIC | Age: 84
End: 2024-12-09

## 2024-12-09 VITALS
DIASTOLIC BLOOD PRESSURE: 78 MMHG | HEIGHT: 65 IN | WEIGHT: 211 LBS | HEART RATE: 74 BPM | RESPIRATION RATE: 18 BRPM | SYSTOLIC BLOOD PRESSURE: 132 MMHG | OXYGEN SATURATION: 97 % | BODY MASS INDEX: 35.16 KG/M2 | TEMPERATURE: 97.6 F

## 2024-12-09 DIAGNOSIS — R09.82 PND (POST-NASAL DRIP): ICD-10-CM

## 2024-12-09 DIAGNOSIS — R05.1 ACUTE COUGH: Primary | ICD-10-CM

## 2024-12-09 PROCEDURE — 99213 OFFICE O/P EST LOW 20 MIN: CPT

## 2024-12-09 RX ORDER — BENZONATATE 200 MG/1
200 CAPSULE ORAL 3 TIMES DAILY PRN
Qty: 30 CAPSULE | Refills: 0 | Status: SHIPPED | OUTPATIENT
Start: 2024-12-09

## 2024-12-09 ASSESSMENT — ENCOUNTER SYMPTOMS: COUGH: 1

## 2024-12-09 ASSESSMENT — PAIN SCALES - GENERAL: PAINLEVEL_OUTOF10: NO PAIN (0)

## 2024-12-09 NOTE — PROGRESS NOTES
Assessment & Plan     Acute cough  - benzonatate (TESSALON) 200 MG capsule; Take 1 capsule (200 mg) by mouth 3 times daily as needed for cough.    PND (post-nasal drip)      I spent a total of 10 minutes on the day of the visit.   Time spent by me today doing chart review, history and exam, documentation and further activities per the note        See Patient Instructions  Patient Instructions   ASSESSMENT    - Ongoing cough likely due to bronchitis or a viral cold, as indicated by clear lung sounds and absence of pneumonia signs.  - Rib pain possibly related to muscle cramps, less likely indicative of a heart attack, given the absence of typical heart attack symptoms such as prolonged chest pain, jaw pain, or left arm pain.  - Post-nasal drip contributing to the cough, as suggested by the presence of rhinorrhea and nighttime coughing fits.  PLAN    - Prescribe Tessalon to be taken three times a day to suppress the cough, especially at night.  - Recommend using Flonase nasal spray over the counter to help with the rhinorrhea and potential post-nasal drip.  - Monitor symptoms and report if they worsen.    Tessalon three times a day    Flonase nasal spray for congestion     Treating a cough depends on its underlying cause. Here are some general approaches to alleviate cough symptoms:    Stay Hydrated: Drink plenty of fluids, such as water, herbal tea, or clear broths. Staying hydrated helps thin mucus and soothe the throat.    Humidify the Air: Use a cool mist humidifier or take a steamy shower to add moisture to the air. This can help relieve dryness and irritation in the throat and airways.    Cough Drops or Hard Candy: Sucking on throat lozenges or hard candy can increase saliva production, which helps soothe an irritated throat and reduce the urge to cough.    Honey: Honey has natural soothing properties and can help relieve cough symptoms, especially in children over the age of one year. Mix honey with warm  water or tea, or simply consume a spoonful as needed.    Over-the-Counter Cough Medications: Depending on the type of cough (dry or productive), over-the-counter cough suppressants (e.g., dextromethorphan) or expectorants (e.g., guaifenesin) may provide relief. Follow dosage instructions carefully.    Elevate Your Head: Prop yourself up with pillows while sleeping to reduce coughing at night, especially if the cough is due to post-nasal drip.    Avoid Irritants: Avoid exposure to smoke, strong odors, and other airborne irritants that can trigger or worsen cough symptoms.    Steam Inhalation: Inhaling steam from a bowl of hot water or using a steam inhaler can help loosen mucus and relieve congestion in the airways.    Rest and Relaxation: Getting adequate rest and reducing stress can support the body's immune system and help speed up recovery from coughing due to viral infections.    Consult a Healthcare Provider: If your cough persists for more than a few weeks, is severe, or is accompanied by other concerning symptoms such as fever, chest pain, or difficulty breathing, it's important to seek medical evaluation. These could be signs of a more serious condition that requires medical attention.    Always consult with a healthcare provider before giving cough medications to children, especially younger children, to ensure safe and appropriate use. They can also help determine the underlying cause of your cough and recommend specific treatments tailored to your condition.    Kwendnote    Patient understood and verbally consented to the treatment plan. Discussed symptoms that would warrant an urgent or emergent visit. All of the patients' questions were answered. Patient was instructed to contact the clinic if questions or concerns arise. Recommend follow up appointments if symptoms worsen or fail to improve. Recommend follow up as needed. Recommend ER in the case of an emergency.    Dahlia Stafford PA-C    Please  note: Voice recognition software may have been used in preparing this note, unintended word substitutions may be present.      Logan Mantilla is a 84 year old, presenting for the following health issues:  Cough        12/9/2024    11:03 AM   Additional Questions   Roomed by Etelvina SANDOVAL     History of Present Illness       Reason for visit:  Cannot shake coughing, pains in chest  Symptom onset:  3-4 weeks ago  Symptoms include:  Congestion coughing chest pains,leg and hand cramps  Symptom intensity:  Moderate  Symptom progression:  Staying the same  Had these symptoms before:  Yes  Has tried/received treatment for these symptoms:  Yes  Previous treatment was successful:  No  What makes it worse:  Lack of sleep  What makes it better:  Cough med , gaviscon He is missing 1 dose(s) of medications per week.   SUBJECTIVE    84-year-old male presents with an ongoing cough that has persisted for 3-4 weeks. Reports experiencing sharp pain in the rib area while playing the violin, which he initially thought was a cramp. He frequently gets cramps in his hands and legs, particularly at night. He has acid reflux and takes Gaviscon to manage it. Previously, he was on a prescription medication that caused leg cramps, which was discontinued.    Denies wheezing and significant difficulty breathing, although he feels something obstructing his airway at times. Experiences coughing fits, especially at night, which disrupt his sleep. Has had a runny nose for the past 3 weeks, which he associates with a cold.    Mentions a history of pneumonia several years ago and expresses concern about not wanting it to recur. Denies any history of heart attack and has never been a smoker, although he was exposed to paint fumes and grinding dust in his early 20s. Describes a past experience with symptoms similar to whooping cough, leading to easy choking on food and saliva.    Uses an over-the-counter nasal spray almost nightly to manage nasal  "congestion.  OBJECTIVE    Physical exam:      - Lungs sound clear                        Objective    /78   Pulse 74   Temp 97.6  F (36.4  C) (Temporal)   Resp 18   Ht 1.657 m (5' 5.24\")   Wt 95.7 kg (211 lb)   SpO2 97%   BMI 34.86 kg/m    Body mass index is 34.86 kg/m .  Physical Exam   GENERAL: alert and no distress  EYES: Eyes grossly normal to inspection, PERRL and conjunctivae and sclerae normal  RESP: lungs clear to auscultation - no rales, rhonchi or wheezes  CV: regular rate and rhythm, normal S1 S2, no S3 or S4, no murmur, click or rub, no peripheral edema  SKIN: no suspicious lesions or rashes  NEURO: Normal strength and tone, mentation intact and speech normal  PSYCH: mentation appears normal, affect normal/bright    Office Visit on 10/24/2024   Component Date Value Ref Range Status    Creatinine Urine mg/dL 10/24/2024 71.1  mg/dL Final    The reference ranges have not been established in urine creatinine. The results should be integrated into the clinical context for interpretation.    Albumin Urine mg/L 10/24/2024 148.4  mg/L Final    The reference ranges have not been established in urine albumin. The results should be integrated into the clinical context for interpretation.    Albumin Urine mg/g Cr 10/24/2024 208.72 (H)  0.00 - 17.00 mg/g Cr Final    Microalbuminuria is defined as an albumin:creatinine ratio of 17 to 299 for males and 25 to 299 for females. A ratio of albumin:creatinine of 300 or higher is indicative of overt proteinuria.  Due to biologic variability, positive results should be confirmed by a second, first-morning random or 24-hour timed urine specimen. If there is discrepancy, a third specimen is recommended. When 2 out of 3 results are in the microalbuminuria range, this is evidence for incipient nephropathy and warrants increased efforts at glucose control, blood pressure control, and institution of therapy with an angiotensin-converting-enzyme (ACE) inhibitor (if the " patient can tolerate it).      Sodium 10/24/2024 138  135 - 145 mmol/L Final    Potassium 10/24/2024 4.1  3.4 - 5.3 mmol/L Final    Chloride 10/24/2024 99  98 - 107 mmol/L Final    Carbon Dioxide (CO2) 10/24/2024 22  22 - 29 mmol/L Final    Anion Gap 10/24/2024 17 (H)  7 - 15 mmol/L Final    Urea Nitrogen 10/24/2024 18.4  8.0 - 23.0 mg/dL Final    Creatinine 10/24/2024 1.70 (H)  0.67 - 1.17 mg/dL Final    GFR Estimate 10/24/2024 40 (L)  >60 mL/min/1.73m2 Final    eGFR calculated using 2021 CKD-EPI equation.    Calcium 10/24/2024 9.8  8.8 - 10.4 mg/dL Final    Reference intervals for this test were updated on 7/16/2024 to reflect our healthy population more accurately. There may be differences in the flagging of prior results with similar values performed with this method. Those prior results can be interpreted in the context of the updated reference intervals.    Glucose 10/24/2024 92  70 - 99 mg/dL Final    Patient Fasting > 8hrs? 10/24/2024 No   Final    Estimated Average Glucose 10/24/2024 131 (H)  <117 mg/dL Final    Hemoglobin A1C 10/24/2024 6.2 (H)  <5.7 % Final    Normal <5.7%   Prediabetes 5.7-6.4%    Diabetes 6.5% or higher     Note: Adopted from ADA consensus guidelines.    Cholesterol 10/24/2024 153  <200 mg/dL Final    Triglycerides 10/24/2024 286 (H)  <150 mg/dL Final    Direct Measure HDL 10/24/2024 28 (L)  >=40 mg/dL Final    LDL Cholesterol Calculated 10/24/2024 68  <100 mg/dL Final    Non HDL Cholesterol 10/24/2024 125  <130 mg/dL Final    Patient Fasting > 8hrs? 10/24/2024 No   Final    WBC Count 10/24/2024 7.5  4.0 - 11.0 10e3/uL Final    RBC Count 10/24/2024 5.05  4.40 - 5.90 10e6/uL Final    Hemoglobin 10/24/2024 16.0  13.3 - 17.7 g/dL Final    Hematocrit 10/24/2024 45.4  40.0 - 53.0 % Final    MCV 10/24/2024 90  78 - 100 fL Final    MCH 10/24/2024 31.7  26.5 - 33.0 pg Final    MCHC 10/24/2024 35.2  31.5 - 36.5 g/dL Final    RDW 10/24/2024 13.5  10.0 - 15.0 % Final    Platelet Count 10/24/2024  182  150 - 450 10e3/uL Final     No results found for any visits on 12/09/24.  No results found.        Signed Electronically by: Dahlia Stafford PA-C

## 2024-12-09 NOTE — TELEPHONE ENCOUNTER
Nurse Triage SBAR    Is this a 2nd Level Triage? YES, LICENSED PRACTITIONER REVIEW IS REQUIRED    Situation: Patient reports he has had a cough ongoing for about 3-4 weeks. He also reports he had some chest pain on Saturday morning    Background: Cough ongoing for 3-4 weeks, HTN    Assessment: Patient denies any fevers, or shortness of breath. Does play the violin and thinks the chest pain may have been related to holding his violin as it happened when he was performing. He feels it may have been more of a muscle cramp. No SOB, no further chest pain since Saturday. No jaw or arm pain. He said the cough has been ongoing for almost 4 weeks.     Protocol Recommended Disposition:   See in Office Today, See in Office Within 3 Days    Recommendation: Scheduled patient with same day provider today         Does the patient meet one of the following criteria for ADS visit consideration? No    Gabino Sauceda RN on 12/9/2024 at 9:38 AM        Reason for Disposition   Cough has been present for > 3 weeks   Patient wants to be seen    Additional Information   Negative: Bluish (or gray) lips or face   Negative: SEVERE difficulty breathing (e.g., struggling for each breath, speaks in single words)   Negative: Rapid onset of cough and has hives   Negative: Coughing started suddenly after medicine, an allergic food or bee sting   Negative: Difficulty breathing after exposure to flames, smoke, or fumes   Negative: Sounds like a life-threatening emergency to the triager   Negative: Previous asthma attacks and this feels like asthma attack   Negative: Dry cough (non-productive; no sputum or minimal clear sputum) and within 14 days of COVID-19 Exposure   Negative: MODERATE difficulty breathing (e.g., speaks in phrases, SOB even at rest, pulse 100-120) and still present when not coughing   Negative: Chest pain present when not coughing   Negative: Passed out (e.g., fainted, lost consciousness, blacked out and was not responding)    Negative: Patient sounds very sick or weak to the triager   Negative: MILD difficulty breathing (e.g., minimal/no SOB at rest, SOB with walking, pulse <100) and still present when not coughing   Negative: Coughed up > 1 tablespoon (15 ml) blood (Exception: Blood-tinged sputum.)   Negative: Fever > 103 F (39.4 C)   Negative: Fever > 101 F (38.3 C) and over 60 years of age   Negative: Fever > 100 F (37.8 C) and has diabetes mellitus or a weak immune system (e.g., HIV positive, cancer chemotherapy, organ transplant, splenectomy, chronic steroids)   Negative: Fever > 100 F (37.8 C) and bedridden (e.g., CVA, chronic illness, recovering from surgery)   Negative: Increasing ankle swelling   Negative: Wheezing is present   Negative: Continuous (nonstop) coughing interferes with work or school and no improvement using cough treatment per Care Advice   Negative: Patient wants to be seen   Negative: SEVERE coughing spells (e.g., whooping sound after coughing, vomiting after coughing)   Negative: Coughing up lacy-colored (reddish-brown) or blood-tinged sputum   Negative: Fever present > 3 days (72 hours)   Negative: Fever returns after gone for over 24 hours and symptoms worse or not improved   Negative: Using nasal washes and pain medicine > 24 hours and sinus pain persists   Negative: Known COPD or other severe lung disease (i.e., bronchiectasis, cystic fibrosis, lung surgery) and symptoms getting worse (i.e., increased sputum purulence or amount, increased breathing difficulty)   Negative: SEVERE difficulty breathing (e.g., struggling for each breath, speaks in single words)   Negative: Difficult to awaken or acting confused (e.g., disoriented, slurred speech)   Negative: Shock suspected (e.g., cold/pale/clammy skin, too weak to stand, low BP, rapid pulse)   Negative: Passed out (i.e., lost consciousness, collapsed and was not responding)   Negative: Chest pain lasting longer than 5 minutes and ANY of the following:          Pain is crushing, pressure-like, or heavy         Over 44 years old          Over 30 years old and one cardiac risk factor (e.g diabetes, high blood pressure, high cholesterol, smoker, or family history of heart disease)         History of heart disease (e.g. angina, heart attack, heart failure, bypass surgery, takes nitroglycerin)   Negative: Followed an injury to chest   Negative: SEVERE chest pain   Negative: Pain also in shoulder(s) or arm(s) or jaw   Negative: Difficulty breathing   Negative: Cocaine use within last 3 days   Negative: Major surgery in the past month   Negative: Hip or leg fracture (broken bone) in past month (or had cast on leg or ankle in past month)   Negative: Illness requiring prolonged bedrest in past month (e.g., immobilization, long hospital stay)   Negative: Long-distance travel in past month (e.g., car, bus, train, plane; with trip lasting 6 or more hours)   Negative: History of prior 'blood clot' in leg or lungs (i.e., deep vein thrombosis, pulmonary embolism)   Negative: History of inherited increased risk of blood clots (e.g., Factor 5 Leiden, Anti-thrombin 3, Protein C or Protein S deficiency, Prothrombin mutation)   Negative: Cancer treatment in the past two months (or has cancer now)   Negative: Heart beating irregularly or very rapidly   Negative: Chest pain lasting longer than 5 minutes and occurred in last 3 days (72 hours) (Exception: Feels exactly the same as previously diagnosed heartburn and has accompanying sour taste in mouth.)   Negative: Chest pain or 'angina' comes and goes and is happening more often (increasing in frequency) or getting worse (increasing in severity) (Exception: Chest pains that last only a few seconds.)   Negative: Dizziness or lightheadedness   Negative: Coughing up blood   Negative: Patient sounds very sick or weak to the triager   Negative: Patient says chest pain feels exactly the same as previously diagnosed 'heartburn' and describes burning in  chest and accompanying sour taste in mouth   Negative: Fever > 100.4 F (38.0 C)   Negative: Chest pain(s) lasting a few seconds persists > 3 days   Negative: Rash in same area as pain (may be described as 'small blisters')   Negative: All other patients with chest pain (Exception: Fleeting chest pain lasting a few seconds.)    Protocols used: Cough-A-OH, Chest Pain-A-OH

## 2024-12-09 NOTE — PATIENT INSTRUCTIONS
ASSESSMENT    - Ongoing cough likely due to bronchitis or a viral cold, as indicated by clear lung sounds and absence of pneumonia signs.  - Rib pain possibly related to muscle cramps, less likely indicative of a heart attack, given the absence of typical heart attack symptoms such as prolonged chest pain, jaw pain, or left arm pain.  - Post-nasal drip contributing to the cough, as suggested by the presence of rhinorrhea and nighttime coughing fits.  PLAN    - Prescribe Tessalon to be taken three times a day to suppress the cough, especially at night.  - Recommend using Flonase nasal spray over the counter to help with the rhinorrhea and potential post-nasal drip.  - Monitor symptoms and report if they worsen.    Tessalon three times a day    Flonase nasal spray for congestion     Treating a cough depends on its underlying cause. Here are some general approaches to alleviate cough symptoms:    Stay Hydrated: Drink plenty of fluids, such as water, herbal tea, or clear broths. Staying hydrated helps thin mucus and soothe the throat.    Humidify the Air: Use a cool mist humidifier or take a steamy shower to add moisture to the air. This can help relieve dryness and irritation in the throat and airways.    Cough Drops or Hard Candy: Sucking on throat lozenges or hard candy can increase saliva production, which helps soothe an irritated throat and reduce the urge to cough.    Honey: Honey has natural soothing properties and can help relieve cough symptoms, especially in children over the age of one year. Mix honey with warm water or tea, or simply consume a spoonful as needed.    Over-the-Counter Cough Medications: Depending on the type of cough (dry or productive), over-the-counter cough suppressants (e.g., dextromethorphan) or expectorants (e.g., guaifenesin) may provide relief. Follow dosage instructions carefully.    Elevate Your Head: Prop yourself up with pillows while sleeping to reduce coughing at night,  especially if the cough is due to post-nasal drip.    Avoid Irritants: Avoid exposure to smoke, strong odors, and other airborne irritants that can trigger or worsen cough symptoms.    Steam Inhalation: Inhaling steam from a bowl of hot water or using a steam inhaler can help loosen mucus and relieve congestion in the airways.    Rest and Relaxation: Getting adequate rest and reducing stress can support the body's immune system and help speed up recovery from coughing due to viral infections.    Consult a Healthcare Provider: If your cough persists for more than a few weeks, is severe, or is accompanied by other concerning symptoms such as fever, chest pain, or difficulty breathing, it's important to seek medical evaluation. These could be signs of a more serious condition that requires medical attention.    Always consult with a healthcare provider before giving cough medications to children, especially younger children, to ensure safe and appropriate use. They can also help determine the underlying cause of your cough and recommend specific treatments tailored to your condition.    Kwendnote    Patient understood and verbally consented to the treatment plan. Discussed symptoms that would warrant an urgent or emergent visit. All of the patients' questions were answered. Patient was instructed to contact the clinic if questions or concerns arise. Recommend follow up appointments if symptoms worsen or fail to improve. Recommend follow up as needed. Recommend ER in the case of an emergency.    Dahlia Stafford PA-C    Please note: Voice recognition software may have been used in preparing this note, unintended word substitutions may be present.

## 2025-01-07 ENCOUNTER — OFFICE VISIT (OUTPATIENT)
Dept: INTERNAL MEDICINE | Facility: CLINIC | Age: 85
End: 2025-01-07
Payer: COMMERCIAL

## 2025-01-07 VITALS
SYSTOLIC BLOOD PRESSURE: 138 MMHG | HEART RATE: 72 BPM | WEIGHT: 214.5 LBS | RESPIRATION RATE: 16 BRPM | OXYGEN SATURATION: 97 % | TEMPERATURE: 97.1 F | HEIGHT: 66 IN | DIASTOLIC BLOOD PRESSURE: 72 MMHG | BODY MASS INDEX: 34.47 KG/M2

## 2025-01-07 DIAGNOSIS — I10 ESSENTIAL HYPERTENSION: Primary | ICD-10-CM

## 2025-01-07 DIAGNOSIS — N18.32 CKD STAGE 3B, GFR 30-44 ML/MIN (H): ICD-10-CM

## 2025-01-07 DIAGNOSIS — G47.62 LEG CRAMPS, SLEEP RELATED: ICD-10-CM

## 2025-01-07 DIAGNOSIS — N18.32 TYPE 2 DIABETES MELLITUS WITH STAGE 3B CHRONIC KIDNEY DISEASE, WITHOUT LONG-TERM CURRENT USE OF INSULIN (H): ICD-10-CM

## 2025-01-07 DIAGNOSIS — E11.22 TYPE 2 DIABETES MELLITUS WITH STAGE 3B CHRONIC KIDNEY DISEASE, WITHOUT LONG-TERM CURRENT USE OF INSULIN (H): ICD-10-CM

## 2025-01-07 LAB
ANION GAP SERPL CALCULATED.3IONS-SCNC: 14 MMOL/L (ref 7–15)
BUN SERPL-MCNC: 19.8 MG/DL (ref 8–23)
CALCIUM SERPL-MCNC: 9.6 MG/DL (ref 8.8–10.4)
CHLORIDE SERPL-SCNC: 101 MMOL/L (ref 98–107)
CK SERPL-CCNC: 132 U/L (ref 39–308)
CREAT SERPL-MCNC: 1.8 MG/DL (ref 0.67–1.17)
EGFRCR SERPLBLD CKD-EPI 2021: 37 ML/MIN/1.73M2
GLUCOSE SERPL-MCNC: 126 MG/DL (ref 70–99)
HCO3 SERPL-SCNC: 24 MMOL/L (ref 22–29)
MAGNESIUM SERPL-MCNC: 2 MG/DL (ref 1.7–2.3)
POTASSIUM SERPL-SCNC: 4.3 MMOL/L (ref 3.4–5.3)
SODIUM SERPL-SCNC: 139 MMOL/L (ref 135–145)
T4 FREE SERPL-MCNC: 0.94 NG/DL (ref 0.9–1.7)
TSH SERPL DL<=0.005 MIU/L-ACNC: 6.54 UIU/ML (ref 0.3–4.2)
VIT B12 SERPL-MCNC: 268 PG/ML (ref 232–1245)

## 2025-01-07 PROCEDURE — 36415 COLL VENOUS BLD VENIPUNCTURE: CPT | Performed by: INTERNAL MEDICINE

## 2025-01-07 PROCEDURE — 82550 ASSAY OF CK (CPK): CPT | Performed by: INTERNAL MEDICINE

## 2025-01-07 PROCEDURE — 99214 OFFICE O/P EST MOD 30 MIN: CPT | Performed by: INTERNAL MEDICINE

## 2025-01-07 PROCEDURE — 83735 ASSAY OF MAGNESIUM: CPT | Performed by: INTERNAL MEDICINE

## 2025-01-07 PROCEDURE — 84439 ASSAY OF FREE THYROXINE: CPT | Performed by: INTERNAL MEDICINE

## 2025-01-07 PROCEDURE — 80048 BASIC METABOLIC PNL TOTAL CA: CPT | Performed by: INTERNAL MEDICINE

## 2025-01-07 PROCEDURE — G2211 COMPLEX E/M VISIT ADD ON: HCPCS | Performed by: INTERNAL MEDICINE

## 2025-01-07 PROCEDURE — 84443 ASSAY THYROID STIM HORMONE: CPT | Performed by: INTERNAL MEDICINE

## 2025-01-07 PROCEDURE — 82607 VITAMIN B-12: CPT | Performed by: INTERNAL MEDICINE

## 2025-01-07 RX ORDER — ATORVASTATIN CALCIUM 10 MG/1
10 TABLET, FILM COATED ORAL DAILY
Qty: 90 TABLET | Refills: 3 | Status: SHIPPED | OUTPATIENT
Start: 2025-01-07

## 2025-01-07 RX ORDER — LISINOPRIL 40 MG/1
40 TABLET ORAL DAILY
Qty: 90 TABLET | Refills: 3 | Status: SHIPPED | OUTPATIENT
Start: 2025-01-07

## 2025-01-07 ASSESSMENT — PAIN SCALES - GENERAL: PAINLEVEL_OUTOF10: MILD PAIN (2)

## 2025-01-07 NOTE — PROGRESS NOTES
Assessment & Plan   Problem List Items Addressed This Visit       Type 2 diabetes mellitus with stage 3b chronic kidney disease, without long-term current use of insulin (H) - Primary    Relevant Medications    atorvastatin (LIPITOR) 10 MG tablet    Other Relevant Orders    TSH with free T4 reflex    Vitamin B12     Other Visit Diagnoses       Essential hypertension        Relevant Medications    lisinopril (ZESTRIL) 40 MG tablet    Other Relevant Orders    Basic metabolic panel  (Ca, Cl, CO2, Creat, Gluc, K, Na, BUN)    Vitamin B12    Leg cramps, sleep related        Relevant Orders    Basic metabolic panel  (Ca, Cl, CO2, Creat, Gluc, K, Na, BUN)    Magnesium    CK total    Vitamin B12           Patient here to establish care as his primary is retiring.  Patient is to stop working as a  he lives [facility].  Overall doing well.    Type 2 diabetes he is on metformin 3 pills a day A1c has been good we will continue the metformin for him.  Can work on weight loss.    Hypertension he is on lisinopril hydrochlorothiazide but having leg cramps and he has some chronic kidney disease with a creatinine up to 1.71 get rid of his hydrochlorothiazide.  Will increase his lisinopril from 20 to 40 mg and stop the hydrochlorothiazide check labs again today and probably again in 2 weeks.    Chronic kidney disease stage IIIb we will stop the hydrochlorothiazide.  Make sure he is not taking any NSAIDs recheck his kidney function today hopefully get that creatinine down below 1.5.    Leg cramps possibly from the hydrochlorothiazide hopefully stopping that will help him we will also check his magnesium CK B12 level because he had some neuropathy and lower B12.  Can make sure that he is taking a magnesium supplement as well.       FUTURE APPOINTMENTS:       - Follow-up for annual visit or as needed      The longitudinal plan of care for the diagnosis(es)/condition(s) as documented were addressed during this visit. Due  "to the added complexity in care, I will continue to support Jose Rafael in the subsequent management and with ongoing continuity of care.        Subjective   Jose Rafael is a 84 year old, presenting for the following health issues:  Establish Care      1/7/2025     6:57 AM   Additional Questions   Roomed by Naz SCHMID     History of Present Illness       Reason for visit:  New doctor visit He is missing 1 dose(s) of medications per week.     Needs a new doctor and doing ok,     Still having leg cramps at night, doesn't get better for him. Some in his hands as well.     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     CKD stage 3 b, says he saw kidney doctor 20 years ago with some hematuria, happened from riding horses. Nothing since then.     Neuropathy and will recheck b12, diabetes is ok for control.         Objective    /72 (BP Location: Left arm, Patient Position: Chair)   Pulse 72   Temp 97.1  F (36.2  C) (Temporal)   Resp 16   Ht 1.676 m (5' 6\")   Wt 97.3 kg (214 lb 8 oz)   SpO2 97%   BMI 34.62 kg/m    Body mass index is 34.62 kg/m .  Physical Exam   GENERAL: alert and no distress  NECK: no adenopathy, no asymmetry, masses, or scars  RESP: lungs clear to auscultation - no rales, rhonchi or wheezes  CV: regular rate and rhythm, normal S1 S2, no S3 or S4, no murmur, click or rub, no peripheral edema  ABDOMEN: soft, nontender, no hepatosplenomegaly, no masses and bowel sounds normal  MS: no gross musculoskeletal defects noted, no edema            Signed Electronically by: Jose Garcia MD    "

## 2025-04-27 ENCOUNTER — HEALTH MAINTENANCE LETTER (OUTPATIENT)
Age: 85
End: 2025-04-27

## 2025-06-23 ENCOUNTER — RESULTS FOLLOW-UP (OUTPATIENT)
Dept: FAMILY MEDICINE | Facility: CLINIC | Age: 85
End: 2025-06-23

## 2025-06-23 ENCOUNTER — HOSPITAL ENCOUNTER (OUTPATIENT)
Dept: GENERAL RADIOLOGY | Facility: CLINIC | Age: 85
Discharge: HOME | End: 2025-06-23
Payer: COMMERCIAL

## 2025-06-23 ENCOUNTER — OFFICE VISIT (OUTPATIENT)
Dept: FAMILY MEDICINE | Facility: CLINIC | Age: 85
End: 2025-06-23
Payer: COMMERCIAL

## 2025-06-23 VITALS
TEMPERATURE: 97.4 F | WEIGHT: 204.8 LBS | OXYGEN SATURATION: 97 % | HEART RATE: 65 BPM | HEIGHT: 66 IN | SYSTOLIC BLOOD PRESSURE: 179 MMHG | BODY MASS INDEX: 32.92 KG/M2 | DIASTOLIC BLOOD PRESSURE: 72 MMHG | RESPIRATION RATE: 14 BRPM

## 2025-06-23 DIAGNOSIS — M54.50 ACUTE LEFT-SIDED LOW BACK PAIN WITHOUT SCIATICA: ICD-10-CM

## 2025-06-23 DIAGNOSIS — M62.830 BACK MUSCLE SPASM: ICD-10-CM

## 2025-06-23 DIAGNOSIS — M25.552 HIP PAIN, LEFT: Primary | ICD-10-CM

## 2025-06-23 DIAGNOSIS — M25.552 HIP PAIN, LEFT: ICD-10-CM

## 2025-06-23 PROCEDURE — 72100 X-RAY EXAM L-S SPINE 2/3 VWS: CPT

## 2025-06-23 PROCEDURE — 3078F DIAST BP <80 MM HG: CPT

## 2025-06-23 PROCEDURE — 3077F SYST BP >= 140 MM HG: CPT

## 2025-06-23 PROCEDURE — 73502 X-RAY EXAM HIP UNI 2-3 VIEWS: CPT

## 2025-06-23 PROCEDURE — 99214 OFFICE O/P EST MOD 30 MIN: CPT

## 2025-06-23 PROCEDURE — 1125F AMNT PAIN NOTED PAIN PRSNT: CPT

## 2025-06-23 RX ORDER — CYCLOBENZAPRINE HCL 5 MG
5 TABLET ORAL
Qty: 15 TABLET | Refills: 0 | Status: SHIPPED | OUTPATIENT
Start: 2025-06-23

## 2025-06-23 RX ORDER — CELECOXIB 100 MG/1
100 CAPSULE ORAL 2 TIMES DAILY
Qty: 30 CAPSULE | Refills: 0 | Status: SHIPPED | OUTPATIENT
Start: 2025-06-23

## 2025-06-23 ASSESSMENT — ENCOUNTER SYMPTOMS
HIP PAIN: 1
BACK PAIN: 1

## 2025-06-23 ASSESSMENT — PAIN SCALES - GENERAL: PAINLEVEL_OUTOF10: MILD PAIN (1)

## 2025-06-23 NOTE — PATIENT INSTRUCTIONS
ASSESSMENT & PLAN    Hip and back pain:  Pain is sharp and jabbing, possibly due to degenerative changes in the spine as indicated by previous X-ray findings. No numbness or tingling down the legs reported. Pain exacerbated by movement, particularly when walking.  Order X-rays of lumbar spine and left hip to assess changes and determine further management. Start Celebrex for pain management, up to twice a day as needed, and discontinue ibuprofen. Prescribe Flexeril to be taken at bedtime for muscle relaxation. Continue Tylenol for pain relief. Recommend physical therapy for exercises, stretches, and range of motion improvement. Consider lidocaine patches for additional pain relief. Follow up with physical therapist for a month and notify primary doctor if no improvement.  Risks and side effects: Celebrex should not be taken with ibuprofen due to potential kidney issues.    Chronic kidney disease:  Chronic kidney disease noted, affecting medication choices for pain management.  Monitor kidney function and avoid ibuprofen due to potential adverse effects on kidney health.      Follow up with PT    Flexeril a tbedtime for muscle spasms    Celebrex as needed for pain    STOP ibuprofen    Continue tylenol    Xrays today    Follow up with physical therapy     Lidocaine patches for 12 hours on, 12 hours off       Diclofenac gel 4 times a day scheduled     Heat pack for 10 minutes followed by exercises 4-5 times a day     Follow up with any new, worsening, or persistent symptoms     Go to the ER in the case of an emergency
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
